# Patient Record
Sex: MALE | Race: WHITE | NOT HISPANIC OR LATINO | URBAN - METROPOLITAN AREA
[De-identification: names, ages, dates, MRNs, and addresses within clinical notes are randomized per-mention and may not be internally consistent; named-entity substitution may affect disease eponyms.]

---

## 2019-05-08 PROBLEM — Z00.00 ENCOUNTER FOR PREVENTIVE HEALTH EXAMINATION: Status: ACTIVE | Noted: 2019-05-08

## 2022-05-07 ENCOUNTER — EMERGENCY (EMERGENCY)
Facility: HOSPITAL | Age: 82
LOS: 1 days | Discharge: HOME | End: 2022-05-07
Attending: EMERGENCY MEDICINE | Admitting: EMERGENCY MEDICINE
Payer: COMMERCIAL

## 2022-05-07 VITALS
TEMPERATURE: 98 F | DIASTOLIC BLOOD PRESSURE: 85 MMHG | OXYGEN SATURATION: 96 % | HEART RATE: 100 BPM | RESPIRATION RATE: 18 BRPM | SYSTOLIC BLOOD PRESSURE: 163 MMHG

## 2022-05-07 PROCEDURE — 93010 ELECTROCARDIOGRAM REPORT: CPT | Mod: 59

## 2022-05-07 PROCEDURE — 99291 CRITICAL CARE FIRST HOUR: CPT

## 2022-05-07 RX ORDER — FAMOTIDINE 10 MG/ML
20 INJECTION INTRAVENOUS ONCE
Refills: 0 | Status: COMPLETED | OUTPATIENT
Start: 2022-05-07 | End: 2022-05-07

## 2022-05-07 RX ORDER — DEXAMETHASONE 0.5 MG/5ML
10 ELIXIR ORAL ONCE
Refills: 0 | Status: COMPLETED | OUTPATIENT
Start: 2022-05-07 | End: 2022-05-07

## 2022-05-07 RX ORDER — EPINEPHRINE 0.3 MG/.3ML
0.3 INJECTION INTRAMUSCULAR; SUBCUTANEOUS ONCE
Refills: 0 | Status: COMPLETED | OUTPATIENT
Start: 2022-05-07 | End: 2022-05-07

## 2022-05-07 RX ADMIN — EPINEPHRINE 0.3 MILLIGRAM(S): 0.3 INJECTION INTRAMUSCULAR; SUBCUTANEOUS at 23:25

## 2022-05-07 RX ADMIN — Medication 102 MILLIGRAM(S): at 23:25

## 2022-05-07 RX ADMIN — FAMOTIDINE 20 MILLIGRAM(S): 10 INJECTION INTRAVENOUS at 23:25

## 2022-05-07 NOTE — ED PROVIDER NOTE - PATIENT PORTAL LINK FT
You can access the FollowMyHealth Patient Portal offered by [facility] by registering at the following website: [email or web address]. By joining [employer]’s FollowMyHealth portal, you will also be able to view your health information using other applications (apps) compatible with our system.

## 2022-05-07 NOTE — ED PROVIDER NOTE - CLINICAL SUMMARY MEDICAL DECISION MAKING FREE TEXT BOX
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 Pt developed mild lower lip swelling last night, was worse this morning. Feels that his voice maybe be slightly raspy. Overall symptoms are improving but still present so came to ED. No other systemic symptoms. Has hx of similar prior lip swelling ~10yrs ago that resolved on its own after 2 days. On lisinopril.  Mild HTN, , other vitals wnl. Exam as above.  ddx: Angioedema, likely 2/2 ACE inhibitor. Clinically no significant airway involvement.   Basic labs, EKG, meds, observe in ED, reassess.   Pt refusing benadryl - states that it causes problems w/ his urination.

## 2022-05-07 NOTE — ED PROVIDER NOTE - NSDCPRINTRESULTS_ED_ALL_ED
Spoke with patient.   Advised per Maria Guadalupe Mcgowan, ELIANE swab positive for yeast which Maria Guadalupe already treated her for.  Otherwise normal.   Patient states understanding.    Patient requests all Lab, Cardiology, and Radiology Results on their Discharge Instructions

## 2022-05-07 NOTE — ED PROVIDER NOTE - NSFOLLOWUPINSTRUCTIONS_ED_ALL_ED_FT
Stay well hydrated.    Return to ER immediately (call 911) for worsening facial swelling, worsening breathing, voice changes, difficulty swallowing, fevers, persistent vomit, uncontrolled pain, worsening lightheaded.    Follow up with primary doctor within 1-2 days.     Discuss starting a different blood pressure medication in place of the lisinopril which you should STOP taking.      Angioedema    WHAT YOU NEED TO KNOW:    What is angioedema? Angioedema is sudden swelling caused by fluid that collects in deep layers of the skin. Swelling occurs most often on the face, lips, tongue, or throat, but it can happen anywhere in the body.     What increases my risk for angioedema? The exact cause of angioedema is often unknown. The following may increase your risk or trigger symptoms:   •Allergic reactions to foods, insect stings, or latex   •Medicines, such as ACE inhibitors, NSAIDs, and aspirin   •Cold, heat, pressure, trauma, or emotional stress   •A medical condition, such as autoimmune thyroid disease, lupus, or cancer   •A family history of angioedema     What are the signs and symptoms of angioedema? Skin swelling may be the only symptom. Swelling may be on one or both sides of the affected area. You may also have any of the following:   •Pain and burning in the swollen area   •Hives or an itchy rash  •A cough, wheezing, and shortness of breath  •Irritated eyes and nose  •Abdominal pain     How is the cause of angioedema diagnosed? Your healthcare provider will examine you and ask about your symptoms. He may also ask about your family medical history, medicines you take, and foods you eat. Tell your healthcare provider about any recent trauma, stress, or contact with allergens. You may need additional testing if you developed anaphylaxis after you were exposed to a trigger and then exercised. This is called exercise-induced anaphylaxis. You may need any of the following:   •Blood tests may be used to check for an autoimmune disease, infection, or inflammation. The tests may also be used to check your liver function.   •Skin prick tests are done to check for allergies.    How is angioedema treated? Angioedema usually goes away within 3 days without treatment, but it may come back. You may need any of the following:   •Antihistamines decrease symptoms such as itching or a rash.   •Epinephrine is medicine used to treat severe allergic reactions such as anaphylaxis.   •Steroids: This medicine may be given to decrease inflammation.

## 2022-05-07 NOTE — ED ADULT NURSE NOTE - OBJECTIVE STATEMENT
Pt presented to the ED with complaints of lip swelling that started yesterday night worsening this morning. Pt denies shortness of breath or throat swelling, able to speak in full sentences. Pt is on lisinopril, hydrochlorothiazide, and metoprolol.

## 2022-05-07 NOTE — ED ADULT TRIAGE NOTE - ARRIVAL INFO ADDITIONAL COMMENTS
pt c/o lower lip swelling since last night which has worsened throughout the day.   speech clear.   no drooling.  pt is on toprol and lisinopril

## 2022-05-07 NOTE — ED PROVIDER NOTE - PHYSICAL EXAMINATION
+Lower lip swelling.   No tonsillar hypertrophy, exudates, erythema. No obvious LAD. No trismus. No stridor/drooling, neck FROM. Normal sounding voice. Uvula midline. No other facial swelling.

## 2022-05-07 NOTE — ED PROVIDER NOTE - OBJECTIVE STATEMENT
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 Pt developed mild lower lip swelling last night, was worse this morning. Feels that his voice maybe be slightly raspy. Overall symptoms are improving but still present so came to ED. No other systemic symptoms. Has hx of similar prior lip swelling ~10yrs ago that resolved on its own after 2 days. On lisinopril.  Denies lightheaded, SOB, CP, sensation of throat closing, nausea, vomiting, diarrhea, abd pain, urinary complaints, rashes, pruritis. Cannot recall specific precipitant (no new food, medications, sprays, etc.).   meds: lisinopril, HCTZ, metoprolol

## 2022-05-07 NOTE — ED PROVIDER NOTE - PROGRESS NOTE DETAILS
Klepfish: pt remains unchanged. no sob. unchanged lower lip swelling. no resp distress. Will reassess. Klepfish: Pt remains well appearing - requesting straight cath - states he usually self caths twice a day and feels like he needs to urinate. No dysuria. Also requesting home dose of flomax. No SOB. Remains well appearing, still w/ lower lip swelling. Will reassess. Klepfish: UA w/ moderate leuk esterase, large blood, no other e/o inection. Clinically not UTI.   Still w/ mild lower lip swelling but improved from prior. Pt states he feels much better, no SOB, wants to go home. Wife at bedside. discussed risks/benefits of further observation/possible admission for persistent symptoms. Pt wants to go home. Discussed importance of outpt follow up and return precautions. Clinically no indication for further emergent ED workup or hospitalization at this time. Comfortable for dc, outpt f/u.

## 2022-05-07 NOTE — ED PROVIDER NOTE - CARE PLAN
1 Principal Discharge DX:	Angioedema of lips   Principal Discharge DX:	Angioedema of lips  Secondary Diagnosis:	Urinary retention

## 2022-05-08 VITALS
TEMPERATURE: 98 F | OXYGEN SATURATION: 98 % | RESPIRATION RATE: 16 BRPM | DIASTOLIC BLOOD PRESSURE: 73 MMHG | SYSTOLIC BLOOD PRESSURE: 149 MMHG | HEART RATE: 78 BPM

## 2022-05-08 LAB
ALBUMIN SERPL ELPH-MCNC: 4.4 G/DL — SIGNIFICANT CHANGE UP (ref 3.3–5)
ALP SERPL-CCNC: 80 U/L — SIGNIFICANT CHANGE UP (ref 40–120)
ALT FLD-CCNC: 17 U/L — SIGNIFICANT CHANGE UP (ref 10–45)
ANION GAP SERPL CALC-SCNC: 12 MMOL/L — SIGNIFICANT CHANGE UP (ref 5–17)
APPEARANCE UR: CLEAR — SIGNIFICANT CHANGE UP
AST SERPL-CCNC: 23 U/L — SIGNIFICANT CHANGE UP (ref 10–40)
BACTERIA # UR AUTO: SIGNIFICANT CHANGE UP /HPF
BASOPHILS # BLD AUTO: 0.09 K/UL — SIGNIFICANT CHANGE UP (ref 0–0.2)
BASOPHILS NFR BLD AUTO: 0.7 % — SIGNIFICANT CHANGE UP (ref 0–2)
BILIRUB SERPL-MCNC: 0.5 MG/DL — SIGNIFICANT CHANGE UP (ref 0.2–1.2)
BILIRUB UR-MCNC: NEGATIVE — SIGNIFICANT CHANGE UP
BUN SERPL-MCNC: 20 MG/DL — SIGNIFICANT CHANGE UP (ref 7–23)
CALCIUM SERPL-MCNC: 9.5 MG/DL — SIGNIFICANT CHANGE UP (ref 8.4–10.5)
CHLORIDE SERPL-SCNC: 98 MMOL/L — SIGNIFICANT CHANGE UP (ref 96–108)
CO2 SERPL-SCNC: 27 MMOL/L — SIGNIFICANT CHANGE UP (ref 22–31)
COLOR SPEC: YELLOW — SIGNIFICANT CHANGE UP
CREAT SERPL-MCNC: 0.95 MG/DL — SIGNIFICANT CHANGE UP (ref 0.5–1.3)
DIFF PNL FLD: ABNORMAL
EGFR: 79 ML/MIN/1.73M2 — SIGNIFICANT CHANGE UP
EOSINOPHIL # BLD AUTO: 0.21 K/UL — SIGNIFICANT CHANGE UP (ref 0–0.5)
EOSINOPHIL NFR BLD AUTO: 1.7 % — SIGNIFICANT CHANGE UP (ref 0–6)
EPI CELLS # UR: SIGNIFICANT CHANGE UP /HPF (ref 0–5)
GLUCOSE SERPL-MCNC: 141 MG/DL — HIGH (ref 70–99)
GLUCOSE UR QL: NEGATIVE — SIGNIFICANT CHANGE UP
HCT VFR BLD CALC: 39.5 % — SIGNIFICANT CHANGE UP (ref 39–50)
HGB BLD-MCNC: 13.1 G/DL — SIGNIFICANT CHANGE UP (ref 13–17)
IMM GRANULOCYTES NFR BLD AUTO: 0.7 % — SIGNIFICANT CHANGE UP (ref 0–1.5)
KETONES UR-MCNC: NEGATIVE — SIGNIFICANT CHANGE UP
LEUKOCYTE ESTERASE UR-ACNC: ABNORMAL
LYMPHOCYTES # BLD AUTO: 19.8 % — SIGNIFICANT CHANGE UP (ref 13–44)
LYMPHOCYTES # BLD AUTO: 2.38 K/UL — SIGNIFICANT CHANGE UP (ref 1–3.3)
MCHC RBC-ENTMCNC: 30.7 PG — SIGNIFICANT CHANGE UP (ref 27–34)
MCHC RBC-ENTMCNC: 33.2 GM/DL — SIGNIFICANT CHANGE UP (ref 32–36)
MCV RBC AUTO: 92.5 FL — SIGNIFICANT CHANGE UP (ref 80–100)
MONOCYTES # BLD AUTO: 1.1 K/UL — HIGH (ref 0–0.9)
MONOCYTES NFR BLD AUTO: 9.1 % — SIGNIFICANT CHANGE UP (ref 2–14)
NEUTROPHILS # BLD AUTO: 8.19 K/UL — HIGH (ref 1.8–7.4)
NEUTROPHILS NFR BLD AUTO: 68 % — SIGNIFICANT CHANGE UP (ref 43–77)
NITRITE UR-MCNC: NEGATIVE — SIGNIFICANT CHANGE UP
NRBC # BLD: 0 /100 WBCS — SIGNIFICANT CHANGE UP (ref 0–0)
PH UR: 6 — SIGNIFICANT CHANGE UP (ref 5–8)
PLATELET # BLD AUTO: 333 K/UL — SIGNIFICANT CHANGE UP (ref 150–400)
POTASSIUM SERPL-MCNC: 4.5 MMOL/L — SIGNIFICANT CHANGE UP (ref 3.5–5.3)
POTASSIUM SERPL-SCNC: 4.5 MMOL/L — SIGNIFICANT CHANGE UP (ref 3.5–5.3)
PROT SERPL-MCNC: 7.6 G/DL — SIGNIFICANT CHANGE UP (ref 6–8.3)
PROT UR-MCNC: NEGATIVE MG/DL — SIGNIFICANT CHANGE UP
RBC # BLD: 4.27 M/UL — SIGNIFICANT CHANGE UP (ref 4.2–5.8)
RBC # FLD: 13.5 % — SIGNIFICANT CHANGE UP (ref 10.3–14.5)
RBC CASTS # UR COMP ASSIST: < 5 /HPF — SIGNIFICANT CHANGE UP
SODIUM SERPL-SCNC: 137 MMOL/L — SIGNIFICANT CHANGE UP (ref 135–145)
SP GR SPEC: 1.02 — SIGNIFICANT CHANGE UP (ref 1–1.03)
UROBILINOGEN FLD QL: 0.2 E.U./DL — SIGNIFICANT CHANGE UP
WBC # BLD: 12.05 K/UL — HIGH (ref 3.8–10.5)
WBC # FLD AUTO: 12.05 K/UL — HIGH (ref 3.8–10.5)
WBC UR QL: < 5 /HPF — SIGNIFICANT CHANGE UP

## 2022-05-08 PROCEDURE — 80053 COMPREHEN METABOLIC PANEL: CPT

## 2022-05-08 PROCEDURE — 81001 URINALYSIS AUTO W/SCOPE: CPT

## 2022-05-08 PROCEDURE — 99291 CRITICAL CARE FIRST HOUR: CPT | Mod: 25

## 2022-05-08 PROCEDURE — 85025 COMPLETE CBC W/AUTO DIFF WBC: CPT

## 2022-05-08 PROCEDURE — 96365 THER/PROPH/DIAG IV INF INIT: CPT

## 2022-05-08 PROCEDURE — 87086 URINE CULTURE/COLONY COUNT: CPT

## 2022-05-08 PROCEDURE — 36415 COLL VENOUS BLD VENIPUNCTURE: CPT

## 2022-05-08 PROCEDURE — 93005 ELECTROCARDIOGRAM TRACING: CPT

## 2022-05-08 RX ORDER — TAMSULOSIN HYDROCHLORIDE 0.4 MG/1
0.4 CAPSULE ORAL ONCE
Refills: 0 | Status: COMPLETED | OUTPATIENT
Start: 2022-05-08 | End: 2022-05-08

## 2022-05-08 RX ADMIN — TAMSULOSIN HYDROCHLORIDE 0.4 MILLIGRAM(S): 0.4 CAPSULE ORAL at 02:01

## 2022-05-08 RX ADMIN — Medication 10 MILLIGRAM(S): at 00:00

## 2022-05-08 NOTE — ED ADULT NURSE REASSESSMENT NOTE - NS ED NURSE REASSESS COMMENT FT1
Improvement noted to lower lip swelling, pt continue to denies shortness of breath, states that "he is feeling fine and ready to go".

## 2022-05-08 NOTE — ED ADULT NURSE REASSESSMENT NOTE - NS ED NURSE REASSESS COMMENT FT1
Straight cath performed, pt tolerated well. 550mL output, UA and UC sent. Safety precautions in place.

## 2022-05-09 LAB
CULTURE RESULTS: SIGNIFICANT CHANGE UP
SPECIMEN SOURCE: SIGNIFICANT CHANGE UP

## 2022-05-10 DIAGNOSIS — K13.0 DISEASES OF LIPS: ICD-10-CM

## 2022-05-10 DIAGNOSIS — I10 ESSENTIAL (PRIMARY) HYPERTENSION: ICD-10-CM

## 2022-05-10 DIAGNOSIS — I45.10 UNSPECIFIED RIGHT BUNDLE-BRANCH BLOCK: ICD-10-CM

## 2022-05-10 DIAGNOSIS — R33.8 OTHER RETENTION OF URINE: ICD-10-CM
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
## 2022-05-10 DIAGNOSIS — T78.3XXA ANGIONEUROTIC EDEMA, INITIAL ENCOUNTER: ICD-10-CM
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
## 2022-05-10 DIAGNOSIS — N40.1 BENIGN PROSTATIC HYPERPLASIA WITH LOWER URINARY TRACT SYMPTOMS: ICD-10-CM

## 2022-06-17 ENCOUNTER — EMERGENCY (EMERGENCY)
Facility: HOSPITAL | Age: 82
LOS: 1 days | Discharge: HOME | End: 2022-06-17
Attending: EMERGENCY MEDICINE | Admitting: EMERGENCY MEDICINE
Payer: MEDICARE

## 2022-06-17 VITALS
DIASTOLIC BLOOD PRESSURE: 84 MMHG | HEART RATE: 100 BPM | SYSTOLIC BLOOD PRESSURE: 184 MMHG | OXYGEN SATURATION: 98 % | RESPIRATION RATE: 16 BRPM | TEMPERATURE: 98 F

## 2022-06-17 PROCEDURE — 99284 EMERGENCY DEPT VISIT MOD MDM: CPT | Mod: GC

## 2022-06-18 ENCOUNTER — EMERGENCY (EMERGENCY)
Facility: HOSPITAL | Age: 82
LOS: 1 days | Discharge: HOME | End: 2022-06-18
Attending: EMERGENCY MEDICINE | Admitting: EMERGENCY MEDICINE
Payer: COMMERCIAL

## 2022-06-18 VITALS
DIASTOLIC BLOOD PRESSURE: 89 MMHG | HEART RATE: 100 BPM | OXYGEN SATURATION: 100 % | TEMPERATURE: 99 F | SYSTOLIC BLOOD PRESSURE: 170 MMHG | RESPIRATION RATE: 18 BRPM

## 2022-06-18 VITALS
HEART RATE: 90 BPM | OXYGEN SATURATION: 100 % | RESPIRATION RATE: 17 BRPM | DIASTOLIC BLOOD PRESSURE: 79 MMHG | TEMPERATURE: 98 F | SYSTOLIC BLOOD PRESSURE: 166 MMHG

## 2022-06-18 PROCEDURE — 73110 X-RAY EXAM OF WRIST: CPT | Mod: 26,LT

## 2022-06-18 PROCEDURE — 73080 X-RAY EXAM OF ELBOW: CPT | Mod: 26,LT

## 2022-06-18 PROCEDURE — 73090 X-RAY EXAM OF FOREARM: CPT | Mod: 26,LT

## 2022-06-18 PROCEDURE — 73140 X-RAY EXAM OF FINGER(S): CPT | Mod: 26,RT

## 2022-06-18 PROCEDURE — 99284 EMERGENCY DEPT VISIT MOD MDM: CPT | Mod: 25,GC

## 2022-06-18 PROCEDURE — 29125 APPL SHORT ARM SPLINT STATIC: CPT | Mod: GC

## 2022-06-18 RX ORDER — LIDOCAINE HCL 20 MG/ML
5 VIAL (ML) INJECTION ONCE
Refills: 0 | Status: COMPLETED | OUTPATIENT
Start: 2022-06-18 | End: 2022-06-18

## 2022-06-18 RX ORDER — OXYCODONE HYDROCHLORIDE 5 MG/1
5 TABLET ORAL ONCE
Refills: 0 | Status: DISCONTINUED | OUTPATIENT
Start: 2022-06-18 | End: 2022-06-18

## 2022-06-18 RX ORDER — OXYCODONE HYDROCHLORIDE 5 MG/1
1 TABLET ORAL
Qty: 9 | Refills: 0
Start: 2022-06-18 | End: 2022-06-20

## 2022-06-18 RX ADMIN — OXYCODONE HYDROCHLORIDE 5 MILLIGRAM(S): 5 TABLET ORAL at 01:57

## 2022-06-18 RX ADMIN — Medication 5 MILLILITER(S): at 04:04

## 2022-06-18 RX ADMIN — OXYCODONE HYDROCHLORIDE 5 MILLIGRAM(S): 5 TABLET ORAL at 04:00

## 2022-06-18 NOTE — ED ADULT NURSE NOTE - OBJECTIVE STATEMENT
pt received to rm 15 , a&ox4 , ambulatory , pmh of HTN, p/w post fall on uneven ground coming home at night. pt denies LOC or head trauma  . Pt breathing even and unlabored on room air. Denies fever, chills, cough, SOB, chest pain, palpitations, dizziness, N/V/D, constipation, numbness, tingling. . pending xray  . pt educated on fall precautions and confirms understanding via teach back method. Stretcher locked in lowest position with siderails up x2. Call bell and personal items within reach.

## 2022-06-18 NOTE — ED PROVIDER NOTE - NS ED ROS FT
REVIEW OF SYSTEMS:    CONSTITUTIONAL: No weakness, fevers or chills  EYES/ENT: No visual changes;  No vertigo or throat pain   NECK: No pain or stiffness  RESPIRATORY: No cough, wheezing, hemoptysis; No shortness of breath  CARDIOVASCULAR: No chest pain or palpitations  GASTROINTESTINAL: No abdominal or epigastric pain. No nausea, vomiting, or hematemesis; No diarrhea or constipation. No melena or hematochezia.  GENITOURINARY: No dysuria, frequency or hematuria  NEUROLOGICAL: C/o pain in left arm  SKIN: No itching, rashes

## 2022-06-18 NOTE — ED PROVIDER NOTE - CLINICAL SUMMARY MEDICAL DECISION MAKING FREE TEXT BOX
83yo M with history of HTN presenting s/p mechanical fall, found to have distal radial fracture displaced. Ortho c/s, xrays.

## 2022-06-18 NOTE — PROVIDER CONTACT NOTE (OTHER) - ASSESSMENT
MD requesting taxi assistance for pt. Pt traveling with spouse. Writer arranged taxi p/up with [employer] #[phone]. Pt p/up in 20 minutes to be escorted to taxi.

## 2022-06-18 NOTE — ED PROVIDER NOTE - OBJECTIVE STATEMENT
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 Patient reports everything started after TURP 3 weeks prior. Reports LE weakness following TURP, no pain. 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 Patient reports everything started after TURP 3 weeks prior. Reports LE weakness following TURP, no pain in lower extremities. Did not hit head. Not c/o any other body pain.

## 2022-06-18 NOTE — ED ADULT TRIAGE NOTE - CHIEF COMPLAINT QUOTE
was seen yesterday for fall dx with left wrist fx. today had an episode of AMS where pt took off cast that was placed. pt A&xo4 but forgetful at times. . speech clear , strength equal. no facial droop

## 2022-06-18 NOTE — CONSULT NOTE ADULT - SUBJECTIVE AND OBJECTIVE BOX
Orthopedic Surgery Consult Note    82yMale RHD presents with severe left wrist pain s/p mechanical fall. 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 Patient denies headstrike and LOC. Denies radiation of pain. Denies numbness, tingling or burning in affected hand/fingers. Patient denies any other injuries.    PAST MEDICAL & SURGICAL HISTORY:  Hypertension        Allergies    lisinopril (Angioedema)    Intolerances          PE  Gen: NAD  LUE:  Superficial abrasions over volar wrist, visible deformity of wrist, + soft tissue swelling   Decreased ROM of wrist 2/2 pain, +ttp about wrist, no ttp about elbow  Forearm comparments soft  Motor intact AIN/PIN/U  SILT M/U/R, 2+ rad    Imaging:  XR showing dorsally displaced left distal radius fracture    Procedure Note:  After skin preparation with alcohol swab 10cc of 1% Lidocaine injected into area around DR/Ulna as hematoma block. Closed reduction maneuver performed and a well padded sugartong splint was applied. Patient tolerated the procedure well. Post-XR obtained which show improved alignment. Pt NVI post procedure.

## 2022-06-18 NOTE — ED PROVIDER NOTE - NSFOLLOWUPINSTRUCTIONS_ED_ALL_ED_FT
You had a DISTAL RADIUS FRACTURE in your left arm.     It was REDUCED by the orthopedic resident.     Follow up with Dr. Odom within one week. Call [phone] for appointment    Please follow up with your primary care doctor after you leave the emergency department so that they can follow up and conduct more testing and treatment as they deem necessary. If you have worsening signs or symptoms of what you came in to the Emergency Department today and are not able to see your doctor, go to your nearest emergency department or return to the [facility] emergency department for further care and management.

## 2022-06-18 NOTE — ED PROVIDER NOTE - PATIENT PORTAL LINK FT
You can access the FollowMyHealth Patient Portal offered by [facility] by registering at the following website: [email or web address]. By joining [employer]’s FollowMyHealth portal, you will also be able to view your health information using other applications (apps) compatible with our system.

## 2022-06-18 NOTE — ED PROVIDER NOTE - ATTENDING CONTRIBUTION TO CARE
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 Patient reports everything started after TURP 3 weeks prior. Reports LE weakness following TURP, no pain in lower extremities. Did not hit head. Not c/o any other body pain.  EXAM: LUE with gross Spoon fork deformity at distal radius, neurovascularly intact, normal radial pulse. Cap refill < 2 sec.   MDM: pt with HTN that had mech slip and fall FOOSH left arm (non dominant hand) that has gross MSK deformity on exam but neurovasc intact. No open wounds/fx. Will obtain XR< likely consult Ortho for reduction and provide pain meds.

## 2022-06-18 NOTE — CONSULT NOTE ADULT - ASSESSMENT
A/P: 82yMale s/p closed reduction and splinting of left distal radius fracture    - Pain control  - Strict Ice/Elevation  - NWB on affected extremity with splint and sling  - Keep splint clean/dry/intact until follow up  - Encourage active finger motion  - Follow up with Dr. Odom within one week. Call [phone] for appointment

## 2022-06-18 NOTE — ED PROVIDER NOTE - PROGRESS NOTE DETAILS
Patient with worsening neurologic status of L. hand, reporting numbness in L middle finger, and half of 2nd and 4th digit. Ortho paged again. Silvina, pgy2: Ortho evaluated patient, bedside closed reduction performed. Silvina, pgy2: Patient with worsening neurologic status of L. hand, reporting numbness in L middle finger, and half of 2nd and 4th digit. Ortho paged again. MONIKA: Pt was advised to f/u with Dr Odom within 1 wk number given to make appt.

## 2022-06-19 VITALS
SYSTOLIC BLOOD PRESSURE: 164 MMHG | RESPIRATION RATE: 17 BRPM | OXYGEN SATURATION: 100 % | HEART RATE: 99 BPM | TEMPERATURE: 99 F | DIASTOLIC BLOOD PRESSURE: 79 MMHG

## 2022-06-19 LAB
ALBUMIN SERPL ELPH-MCNC: 4.6 G/DL — SIGNIFICANT CHANGE UP (ref 3.3–5)
ALP SERPL-CCNC: 77 U/L — SIGNIFICANT CHANGE UP (ref 40–120)
ALT FLD-CCNC: 20 U/L — SIGNIFICANT CHANGE UP (ref 4–41)
ANION GAP SERPL CALC-SCNC: 14 MMOL/L — SIGNIFICANT CHANGE UP (ref 7–14)
APPEARANCE UR: CLEAR — SIGNIFICANT CHANGE UP
AST SERPL-CCNC: 22 U/L — SIGNIFICANT CHANGE UP (ref 4–40)
BACTERIA # UR AUTO: ABNORMAL
BILIRUB SERPL-MCNC: 1.1 MG/DL — SIGNIFICANT CHANGE UP (ref 0.2–1.2)
BILIRUB UR-MCNC: NEGATIVE — SIGNIFICANT CHANGE UP
BUN SERPL-MCNC: 25 MG/DL — HIGH (ref 7–23)
CALCIUM SERPL-MCNC: 9.3 MG/DL — SIGNIFICANT CHANGE UP (ref 8.4–10.5)
CHLORIDE SERPL-SCNC: 98 MMOL/L — SIGNIFICANT CHANGE UP (ref 98–107)
CO2 SERPL-SCNC: 23 MMOL/L — SIGNIFICANT CHANGE UP (ref 22–31)
COLOR SPEC: SIGNIFICANT CHANGE UP
CREAT SERPL-MCNC: 1.05 MG/DL — SIGNIFICANT CHANGE UP (ref 0.5–1.3)
DIFF PNL FLD: ABNORMAL
EGFR: 70 ML/MIN/1.73M2 — SIGNIFICANT CHANGE UP
EPI CELLS # UR: 1 /HPF — SIGNIFICANT CHANGE UP (ref 0–5)
GLUCOSE SERPL-MCNC: 147 MG/DL — HIGH (ref 70–99)
GLUCOSE UR QL: NEGATIVE — SIGNIFICANT CHANGE UP
HCT VFR BLD CALC: 45.2 % — SIGNIFICANT CHANGE UP (ref 39–50)
HGB BLD-MCNC: 14.3 G/DL — SIGNIFICANT CHANGE UP (ref 13–17)
HYALINE CASTS # UR AUTO: 1 /LPF — SIGNIFICANT CHANGE UP (ref 0–7)
KETONES UR-MCNC: NEGATIVE — SIGNIFICANT CHANGE UP
LEUKOCYTE ESTERASE UR-ACNC: ABNORMAL
MCHC RBC-ENTMCNC: 29.4 PG — SIGNIFICANT CHANGE UP (ref 27–34)
MCHC RBC-ENTMCNC: 31.6 GM/DL — LOW (ref 32–36)
MCV RBC AUTO: 92.8 FL — SIGNIFICANT CHANGE UP (ref 80–100)
NITRITE UR-MCNC: NEGATIVE — SIGNIFICANT CHANGE UP
NRBC # BLD: 0 /100 WBCS — SIGNIFICANT CHANGE UP
NRBC # FLD: 0 K/UL — SIGNIFICANT CHANGE UP
PH UR: 6 — SIGNIFICANT CHANGE UP (ref 5–8)
PLATELET # BLD AUTO: 312 K/UL — SIGNIFICANT CHANGE UP (ref 150–400)
POTASSIUM SERPL-MCNC: 4 MMOL/L — SIGNIFICANT CHANGE UP (ref 3.5–5.3)
POTASSIUM SERPL-SCNC: 4 MMOL/L — SIGNIFICANT CHANGE UP (ref 3.5–5.3)
PROT SERPL-MCNC: 7.6 G/DL — SIGNIFICANT CHANGE UP (ref 6–8.3)
PROT UR-MCNC: ABNORMAL
RBC # BLD: 4.87 M/UL — SIGNIFICANT CHANGE UP (ref 4.2–5.8)
RBC # FLD: 14.2 % — SIGNIFICANT CHANGE UP (ref 10.3–14.5)
RBC CASTS # UR COMP ASSIST: 2 /HPF — SIGNIFICANT CHANGE UP (ref 0–4)
SODIUM SERPL-SCNC: 135 MMOL/L — SIGNIFICANT CHANGE UP (ref 135–145)
SP GR SPEC: 1.02 — SIGNIFICANT CHANGE UP (ref 1–1.05)
UROBILINOGEN FLD QL: SIGNIFICANT CHANGE UP
WBC # BLD: 13.1 K/UL — HIGH (ref 3.8–10.5)
WBC # FLD AUTO: 13.1 K/UL — HIGH (ref 3.8–10.5)
WBC UR QL: 37 /HPF — HIGH (ref 0–5)

## 2022-06-19 PROCEDURE — 73110 X-RAY EXAM OF WRIST: CPT | Mod: 26,LT

## 2022-06-19 PROCEDURE — 70450 CT HEAD/BRAIN W/O DYE: CPT | Mod: 26,MA

## 2022-06-19 RX ORDER — ACETAMINOPHEN 500 MG
650 TABLET ORAL ONCE
Refills: 0 | Status: COMPLETED | OUTPATIENT
Start: 2022-06-19 | End: 2022-06-19

## 2022-06-19 RX ORDER — CEFTRIAXONE 500 MG/1
1000 INJECTION, POWDER, FOR SOLUTION INTRAMUSCULAR; INTRAVENOUS ONCE
Refills: 0 | Status: COMPLETED | OUTPATIENT
Start: 2022-06-19 | End: 2022-06-19

## 2022-06-19 RX ORDER — CEFUROXIME AXETIL 250 MG
1 TABLET ORAL
Qty: 20 | Refills: 0
Start: 2022-06-19 | End: 2022-06-28

## 2022-06-19 RX ADMIN — CEFTRIAXONE 100 MILLIGRAM(S): 500 INJECTION, POWDER, FOR SOLUTION INTRAMUSCULAR; INTRAVENOUS at 02:10

## 2022-06-19 RX ADMIN — Medication 650 MILLIGRAM(S): at 02:09

## 2022-06-19 NOTE — ED PROVIDER NOTE - ATTENDING CONTRIBUTION TO CARE
DR. BLOCH, ATTENDING MD-  I performed a face to face bedside interview with patient regarding history of present illness, review of symptoms and past medical history. I completed an independent physical exam.  I have discussed patient's plan of care with the resident.  Patient with confusion and agitation after taking oxycodone for wrist fx, tore off splint,  Now alert and oriented, HEENT nml heart s1s2, lungs clear, abd  protruberant, nontender, extrem no edema, neuro motor and sensory intact.

## 2022-06-19 NOTE — ED PROVIDER NOTE - PATIENT PORTAL LINK FT
You can access the FollowMyHealth Patient Portal offered by [facility] by registering at the following website: [email or web address]. By joining [employer]’s FollowMyHealth portal, you will also be able to view your health information using other applications (apps) compatible with our system.

## 2022-06-19 NOTE — ED PROVIDER NOTE - NSFOLLOWUPINSTRUCTIONS_ED_ALL_ED_FT
Urinary Tract Infection    A urinary tract infection (UTI) is an infection of any part of the urinary tract, which includes the kidneys, ureters, bladder, and urethra. Risk factors include ignoring your need to urinate, wiping back to front if female, being an uncircumcised male, and having diabetes or a weak immune system. Symptoms include frequent urination, pain or burning with urination, foul smelling urine, cloudy urine, pain in the lower abdomen, blood in the urine, and fever. If you were prescribed an antibiotic medicine, take it as told by your health care provider. Do not stop taking the antibiotic even if you start to feel better.    SEEK IMMEDIATE MEDICAL CARE IF YOU HAVE ANY OF THE FOLLOWING SYMPTOMS: severe back or abdominal pain, fever, inability to keep fluids or medicine down, dizziness/lightheadedness, or a change in mental status.     -Please follow up with your Primary Care Doctor within 24-48 hours and bring your paperwork     Fracture    A fracture is a break in one of your bones. This can occur from a variety of injuries, especially traumatic ones. Symptoms include pain, bruising, or swelling. Do not use the injured limb. If a fracture is in one of the bones below your waist, do not put weight on that limb unless instructed to do so by your healthcare provider. Crutches or a cane may have been provided. A splint or cast may have been applied by your health care provider. Make sure to keep it dry and follow up with an orthopedist as instructed.    SEEK IMMEDIATE MEDICAL CARE IF YOU HAVE ANY OF THE FOLLOWING SYMPTOMS: numbness, tingling, increasing pain, or weakness in any part of the injured limb.     - Keep splint on, encourage finger movement, follow up with orthopedics in 6 days

## 2022-06-19 NOTE — ED PROVIDER NOTE - PROGRESS NOTE DETAILS
Phillip, PGY3: sugar tong splint applied and given sling. VSS. Time was taken to answer all of patients questions and concerns. Return precaution instructions were given and patient understands and feels comfortable with disposition home. will f/u urine culture results and see orthopedics in 6d

## 2022-06-19 NOTE — ED ADULT NURSE NOTE - PAIN RATING/NUMBER SCALE (0-10): ACTIVITY
7 Drysol Pregnancy And Lactation Text: This medication is considered safe during pregnancy and breast feeding.

## 2022-06-19 NOTE — ED PROVIDER NOTE - OBJECTIVE STATEMENT
81y/o M w/ h/o HTN, recent Colles fracture seen yesterday in [facility] ED s/p mechanical fall and had Colles fracture returns to the ED w/ wife for AMS. As per wife pt was having pain, took oxycodone and was altered ripped off splint. Now is back to baseline mental status. No numbness, tingling, pain out of proportion, rash, bleeding. 81y/o M w/ h/o HTN, recent Colles fracture seen yesterday in [facility] ED s/p mechanical fall returns to the ED w/ wife for AMS. As per wife pt was having pain, took oxycodone and was altered ripped off splint. Now is back to baseline mental status. Has been urinating more frequently. No numbness, tingling, pain out of proportion, rash, bleeding.

## 2022-06-19 NOTE — ED PROVIDER NOTE - CLINICAL SUMMARY MEDICAL DECISION MAKING FREE TEXT BOX
83y/o M w/ h/o HTN, recent Colles fracture seen yesterday in [facility] ED s/p mechanical fall returns for AMS, ripped off splint. Now is back to baseline mental status without signs of compartment syndrome or open fracture. Family attributes symptoms to high dose of oxycodone and opioid naive. PLan UA, XR r/o worsening fx, labs, CTH and likely d/c home with tylenol for pain regimen.

## 2022-06-19 NOTE — ED ADULT NURSE NOTE - OBJECTIVE STATEMENT
Pt received to room 1. Pt A&Ox3, amb at baseline with assistance. Pmh of HTN. Pt coming in after being seen yesterday at [facility] for fall with left wrist fracture. While pt was here, ortho team came to see the pt and performed a reduction in which pt was discharged and prescribed oxycodone for pain control. As per pt and wife at bedside, pt states the medication strength may have been too strong and caused AMS to the pt. Pt slightly remembers what happened, but states he knows that his behavior was erratic and tried ripping off the cast. Pt vitally stable, blood pressure elevated. Resp even unlabored, abd soft nontender, pedal pulses 2+ bilaterally. 20G IV placed to R AC, labs sent, awaiting CT results.

## 2022-06-19 NOTE — ED PROVIDER NOTE - NS ED ROS FT
GENERAL: No fever, no chills  EYES: no change in vision  HEENT: no trouble swallowing, no trouble speaking  CARDIAC: no chest pain, no palpitations  PULMONARY: no cough, no SOB  GI: no abdominal pain, no nausea, no vomiting, no diarrhea, no constipation  : no dysuria, no frequency, no change in appearance, no odor of urine  SKIN: no rashes  NEURO: no headache, no weakness  MSK: +L wrist pain

## 2022-06-20 PROBLEM — Z00.00 ENCOUNTER FOR PREVENTIVE HEALTH EXAMINATION: Noted: 2022-06-20

## 2022-06-20 LAB
CULTURE RESULTS: NO GROWTH — SIGNIFICANT CHANGE UP
SPECIMEN SOURCE: SIGNIFICANT CHANGE UP

## 2022-06-21 NOTE — ED POST DISCHARGE NOTE - RESULT SUMMARY
LEISA Pop: Dr. Crouch called for pts ucx results, spoke w/ pt informed of negative ucx, said he can stop abx.

## 2022-06-22 ENCOUNTER — APPOINTMENT (OUTPATIENT)
Dept: ORTHOPEDIC SURGERY | Facility: CLINIC | Age: 82
End: 2022-06-22

## 2022-06-24 LAB
CULTURE RESULTS: SIGNIFICANT CHANGE UP
CULTURE RESULTS: SIGNIFICANT CHANGE UP
SPECIMEN SOURCE: SIGNIFICANT CHANGE UP
SPECIMEN SOURCE: SIGNIFICANT CHANGE UP

## 2022-06-25 ENCOUNTER — FORM ENCOUNTER (OUTPATIENT)
Age: 82
End: 2022-06-25

## 2022-07-19 PROBLEM — I10 ESSENTIAL (PRIMARY) HYPERTENSION: Chronic | Status: ACTIVE | Noted: 2022-06-18

## 2022-07-21 ENCOUNTER — APPOINTMENT (OUTPATIENT)
Dept: PULMONOLOGY | Facility: CLINIC | Age: 82
End: 2022-07-21

## 2022-07-21 VITALS
TEMPERATURE: 98.2 F | OXYGEN SATURATION: 92 % | HEART RATE: 79 BPM | SYSTOLIC BLOOD PRESSURE: 176 MMHG | DIASTOLIC BLOOD PRESSURE: 74 MMHG

## 2022-07-21 LAB
BILIRUB UR QL STRIP: NORMAL
CLARITY UR: CLEAR
COLLECTION METHOD: NORMAL
GLUCOSE UR-MCNC: NORMAL
HCG UR QL: 0.2 EU/DL
HGB UR QL STRIP.AUTO: NORMAL
KETONES UR-MCNC: NORMAL
LEUKOCYTE ESTERASE UR QL STRIP: NORMAL
NITRITE UR QL STRIP: NORMAL
PH UR STRIP: 5.5
PROT UR STRIP-MCNC: NORMAL
SP GR UR STRIP: 1.01

## 2022-07-21 PROCEDURE — 99204 OFFICE O/P NEW MOD 45 MIN: CPT | Mod: 25

## 2022-07-21 PROCEDURE — 81003 URINALYSIS AUTO W/O SCOPE: CPT | Mod: QW

## 2022-07-21 PROCEDURE — 71046 X-RAY EXAM CHEST 2 VIEWS: CPT

## 2022-07-21 NOTE — ASSESSMENT
[FreeTextEntry1] : Recent change in mental status likely secondary to combination of head injury urinary tract infection and use of narcotics.  However I am concerned about his overall neurological status and he definitely needs a neurology evaluation.  He should be given some time to clear the acute phase before this takes place but ultimately likely will be required.\par \par Check labs and urine culture.\par \par No obvious cause of wheezing noted in the mornings possibly this is an acid reflux issue.

## 2022-07-21 NOTE — HISTORY OF PRESENT ILLNESS
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  Has had overall deterioration over the past several weeks.  He had a fall and sustained a Colles' fracture.  He was treated with narcotics and he had a series of urinary tract infections.  His acute visit was precipitated by a complaint of noisy breathing noted in the mornings.  He has a history of hypertension.  He has a history of of several recent urological procedures including a UroLift and a TURP.  Up until these events he was working full-time he is now working about 1 day a week.  He has no reported underlying cardiopulmonary disease.\par \par \par \par \par \par

## 2022-07-21 NOTE — PHYSICAL EXAM
[No Acute Distress] : no acute distress [Normal Oropharynx] : normal oropharynx [Normal Appearance] : normal appearance [No Neck Mass] : no neck mass [Normal Rate/Rhythm] : normal rate/rhythm [Normal S1, S2] : normal s1, s2 [No Murmurs] : no murmurs [No Resp Distress] : no resp distress [Clear to Auscultation Bilaterally] : clear to auscultation bilaterally [No Abnormalities] : no abnormalities [Benign] : benign [Normal Gait] : normal gait [No Clubbing] : no clubbing [No Cyanosis] : no cyanosis [No Edema] : no edema [FROM] : FROM [Normal Color/ Pigmentation] : normal color/ pigmentation [Oriented x3] : oriented x3 [Normal Affect] : normal affect [TextBox_132] : Wide gait

## 2022-07-21 NOTE — REVIEW OF SYSTEMS
[Fatigue] : fatigue [Chills] : chills [Wheezing] : wheezing [Urgency] : frequency [Negative] : Endocrine [TextBox_119] : See HPI [TextBox_83] : See HPI

## 2022-07-21 NOTE — PROCEDURE
[FreeTextEntry1] : Brain MRI shows mild microvascular changes bilaterally in the supratentorial compartment.  To my review the ventricles appear somewhat enlarged although this is likely age-appropriate.

## 2022-07-22 LAB
ALBUMIN SERPL ELPH-MCNC: 4.8 G/DL
ALP BLD-CCNC: 105 U/L
ALT SERPL-CCNC: 21 U/L
ANION GAP SERPL CALC-SCNC: 12 MMOL/L
AST SERPL-CCNC: 16 U/L
BASOPHILS # BLD AUTO: 0.08 K/UL
BASOPHILS NFR BLD AUTO: 0.9 %
BILIRUB SERPL-MCNC: 0.4 MG/DL
BUN SERPL-MCNC: 19 MG/DL
CALCIUM SERPL-MCNC: 9.9 MG/DL
CHLORIDE SERPL-SCNC: 100 MMOL/L
CO2 SERPL-SCNC: 29 MMOL/L
CREAT SERPL-MCNC: 0.93 MG/DL
EGFR: 81 ML/MIN/1.73M2
EOSINOPHIL # BLD AUTO: 0.35 K/UL
EOSINOPHIL NFR BLD AUTO: 4.1 %
GLUCOSE SERPL-MCNC: 124 MG/DL
HCT VFR BLD CALC: 46.3 %
HGB BLD-MCNC: 14.6 G/DL
IMM GRANULOCYTES NFR BLD AUTO: 0.7 %
LYMPHOCYTES # BLD AUTO: 2.07 K/UL
LYMPHOCYTES NFR BLD AUTO: 24 %
MAN DIFF?: NORMAL
MCHC RBC-ENTMCNC: 29.1 PG
MCHC RBC-ENTMCNC: 31.5 GM/DL
MCV RBC AUTO: 92.4 FL
MONOCYTES # BLD AUTO: 0.83 K/UL
MONOCYTES NFR BLD AUTO: 9.6 %
NEUTROPHILS # BLD AUTO: 5.22 K/UL
NEUTROPHILS NFR BLD AUTO: 60.7 %
NT-PROBNP SERPL-MCNC: 275 PG/ML
PLATELET # BLD AUTO: 324 K/UL
POTASSIUM SERPL-SCNC: 4.5 MMOL/L
PROT SERPL-MCNC: 7.3 G/DL
RBC # BLD: 5.01 M/UL
RBC # FLD: 14.6 %
SODIUM SERPL-SCNC: 141 MMOL/L
TSH SERPL-ACNC: 2.69 UIU/ML
WBC # FLD AUTO: 8.61 K/UL

## 2022-07-23 ENCOUNTER — NON-APPOINTMENT (OUTPATIENT)
Age: 82
End: 2022-07-23

## 2022-07-25 LAB — BACTERIA UR CULT: NORMAL

## 2022-07-29 ENCOUNTER — APPOINTMENT (OUTPATIENT)
Dept: PULMONOLOGY | Facility: CLINIC | Age: 82
End: 2022-07-29

## 2022-07-29 ENCOUNTER — LABORATORY RESULT (OUTPATIENT)
Age: 82
End: 2022-07-29

## 2022-07-29 VITALS — HEART RATE: 72 BPM | DIASTOLIC BLOOD PRESSURE: 72 MMHG | SYSTOLIC BLOOD PRESSURE: 162 MMHG | OXYGEN SATURATION: 95 %

## 2022-07-29 DIAGNOSIS — N39.0 URINARY TRACT INFECTION, SITE NOT SPECIFIED: ICD-10-CM

## 2022-07-29 DIAGNOSIS — I10 ESSENTIAL (PRIMARY) HYPERTENSION: ICD-10-CM

## 2022-07-29 DIAGNOSIS — R29.6 REPEATED FALLS: ICD-10-CM

## 2022-07-29 PROCEDURE — 99214 OFFICE O/P EST MOD 30 MIN: CPT | Mod: 25

## 2022-07-29 PROCEDURE — 95012 NITRIC OXIDE EXP GAS DETER: CPT

## 2022-07-29 PROCEDURE — 94060 EVALUATION OF WHEEZING: CPT

## 2022-07-29 PROCEDURE — ZZZZZ: CPT

## 2022-07-30 NOTE — ASSESSMENT
[FreeTextEntry1] : Appears to have component of asthma.  Note elevated NIOX.  Start Asmanex inhaler.  Assess allergies.\par \par Recommend urology evaluation referred to [facility]\par \par Needs full neurology assessment.  Daughter is neurologist.  Refer to Stepan Kelley, requesting physical therapy evaluation as well.

## 2022-07-30 NOTE — HISTORY OF PRESENT ILLNESS
[TextBox_4] : Continues to have wheezing issues.  Wheezing issues primarily first waking up in the morning.  Walking badly says "his shoes are not good".  Recovering from fall still with recall issues regarding memory.  No acute urological complaint however still having incontinence issues.

## 2022-08-01 LAB
BASOPHILS # BLD AUTO: 0.08 K/UL
BASOPHILS NFR BLD AUTO: 1 %
EOSINOPHIL # BLD AUTO: 0.34 K/UL
EOSINOPHIL NFR BLD AUTO: 4.4 %
FOLATE SERPL-MCNC: 15.6 NG/ML
HCT VFR BLD CALC: 45 %
HGB BLD-MCNC: 14.2 G/DL
IMM GRANULOCYTES NFR BLD AUTO: 0.5 %
LYMPHOCYTES # BLD AUTO: 2.28 K/UL
LYMPHOCYTES NFR BLD AUTO: 29.7 %
MAN DIFF?: NORMAL
MCHC RBC-ENTMCNC: 29.2 PG
MCHC RBC-ENTMCNC: 31.6 GM/DL
MCV RBC AUTO: 92.6 FL
MONOCYTES # BLD AUTO: 0.79 K/UL
MONOCYTES NFR BLD AUTO: 10.3 %
NEUTROPHILS # BLD AUTO: 4.15 K/UL
NEUTROPHILS NFR BLD AUTO: 54.1 %
PLATELET # BLD AUTO: 319 K/UL
RBC # BLD: 4.86 M/UL
RBC # FLD: 13.8 %
VIT B12 SERPL-MCNC: 515 PG/ML
WBC # FLD AUTO: 7.68 K/UL

## 2022-08-03 LAB
A ALTERNATA IGE QN: <0.1 KUA/L
A ALTERNATA IGE QN: <0.1 KUA/L
A FUMIGATUS IGE QN: <0.1 KUA/L
A FUMIGATUS IGE QN: <0.1 KUA/L
BERMUDA GRASS IGE QN: <0.1 KUA/L
BOXELDER IGE QN: <0.1 KUA/L
C ALBICANS IGE QN: <0.1 KUA/L
C HERBARUM IGE QN: <0.1 KUA/L
C HERBARUM IGE QN: <0.1 KUA/L
CALIF WALNUT IGE QN: NORMAL
CAT DANDER IGE QN: <0.1 KUA/L
CAT DANDER IGE QN: <0.1 KUA/L
CLAM IGE QN: <0.1 KUA/L
CMN PIGWEED IGE QN: <0.1 KUA/L
CODFISH IGE QN: <0.1 KUA/L
COMMON RAGWEED IGE QN: <0.1 KUA/L
COMMON RAGWEED IGE QN: <0.1 KUA/L
CORN IGE QN: <0.1 KUA/L
COTTONWOOD IGE QN: <0.1 KUA/L
COW MILK IGE QN: <0.1 KUA/L
D FARINAE IGE QN: <0.1 KUA/L
D FARINAE IGE QN: <0.1 KUA/L
D PTERONYSS IGE QN: <0.1 KUA/L
D PTERONYSS IGE QN: <0.1 KUA/L
DEPRECATED A ALTERNATA IGE RAST QL: 0
DEPRECATED A ALTERNATA IGE RAST QL: 0
DEPRECATED A FUMIGATUS IGE RAST QL: 0
DEPRECATED A FUMIGATUS IGE RAST QL: 0
DEPRECATED BERMUDA GRASS IGE RAST QL: 0
DEPRECATED BOXELDER IGE RAST QL: 0
DEPRECATED C ALBICANS IGE RAST QL: 0
DEPRECATED C HERBARUM IGE RAST QL: 0
DEPRECATED C HERBARUM IGE RAST QL: 0
DEPRECATED CAT DANDER IGE RAST QL: 0
DEPRECATED CAT DANDER IGE RAST QL: 0
DEPRECATED CLAM IGE RAST QL: 0
DEPRECATED CODFISH IGE RAST QL: 0
DEPRECATED COMMON PIGWEED IGE RAST QL: 0
DEPRECATED COMMON RAGWEED IGE RAST QL: 0
DEPRECATED COMMON RAGWEED IGE RAST QL: 0
DEPRECATED CORN IGE RAST QL: 0
DEPRECATED COTTONWOOD IGE RAST QL: 0
DEPRECATED COW MILK IGE RAST QL: 0
DEPRECATED D FARINAE IGE RAST QL: 0
DEPRECATED D FARINAE IGE RAST QL: 0
DEPRECATED D PTERONYSS IGE RAST QL: 0
DEPRECATED D PTERONYSS IGE RAST QL: 0
DEPRECATED DOG DANDER IGE RAST QL: 0
DEPRECATED DOG DANDER IGE RAST QL: 0
DEPRECATED EGG WHITE IGE RAST QL: 0
DEPRECATED GOOSEFOOT IGE RAST QL: 0
DEPRECATED LONDON PLANE IGE RAST QL: NORMAL
DEPRECATED M RACEMOSUS IGE RAST QL: 0
DEPRECATED MOUSE URINE PROT IGE RAST QL: NORMAL
DEPRECATED MUGWORT IGE RAST QL: 0
DEPRECATED P NOTATUM IGE RAST QL: 0
DEPRECATED PEANUT IGE RAST QL: NORMAL
DEPRECATED RED CEDAR IGE RAST QL: NORMAL
DEPRECATED ROACH IGE RAST QL: 0
DEPRECATED ROACH IGE RAST QL: 0
DEPRECATED SCALLOP IGE RAST QL: NORMAL
DEPRECATED SESAME SEED IGE RAST QL: NORMAL
DEPRECATED SHEEP SORREL IGE RAST QL: NORMAL
DEPRECATED SHRIMP IGE RAST QL: NORMAL
DEPRECATED SILVER BIRCH IGE RAST QL: 0
DEPRECATED SOYBEAN IGE RAST QL: NORMAL
DEPRECATED TIMOTHY IGE RAST QL: NORMAL
DEPRECATED TIMOTHY IGE RAST QL: NORMAL
DEPRECATED WALNUT IGE RAST QL: NORMAL
DEPRECATED WHEAT IGE RAST QL: NORMAL
DEPRECATED WHITE ASH IGE RAST QL: NORMAL
DEPRECATED WHITE OAK IGE RAST QL: NORMAL
DEPRECATED WHITE OAK IGE RAST QL: NORMAL
DOG DANDER IGE QN: <0.1 KUA/L
DOG DANDER IGE QN: <0.1 KUA/L
EGG WHITE IGE QN: <0.1 KUA/L
GOOSEFOOT IGE QN: <0.1 KUA/L
LONDON PLANE IGE QN: NORMAL
M RACEMOSUS IGE QN: <0.1 KUA/L
MOUSE URINE PROT IGE QN: NORMAL
MUGWORT IGE QN: <0.1 KUA/L
MULBERRY (T70) CLASS: NORMAL
MULBERRY (T70) CONC: NORMAL
P NOTATUM IGE QN: <0.1 KUA/L
PEANUT IGE QN: NORMAL
RED CEDAR IGE QN: NORMAL
ROACH IGE QN: <0.1 KUA/L
ROACH IGE QN: <0.1 KUA/L
SCALLOP IGE QN: NORMAL
SCALLOP IGE QN: NORMAL
SESAME SEED IGE QN: NORMAL
SHEEP SORREL IGE QN: NORMAL
SILVER BIRCH IGE QN: <0.1 KUA/L
SOYBEAN IGE QN: NORMAL
TIMOTHY IGE QN: NORMAL
TIMOTHY IGE QN: NORMAL
TREE ALLERG MIX1 IGE QL: NORMAL
WALNUT IGE QN: NORMAL
WHEAT IGE QN: NORMAL
WHITE ASH IGE QN: NORMAL
WHITE ELM IGE QN: 0
WHITE ELM IGE QN: <0.1 KUA/L
WHITE OAK IGE QN: NORMAL
WHITE OAK IGE QN: NORMAL

## 2022-08-05 LAB
A FLAVUS AB FLD QL: NEGATIVE
A FUMIGATUS AB FLD QL: NEGATIVE
A NIGER AB FLD QL: NEGATIVE

## 2022-08-26 ENCOUNTER — APPOINTMENT (OUTPATIENT)
Dept: PULMONOLOGY | Facility: CLINIC | Age: 82
End: 2022-08-26

## 2022-08-26 VITALS
TEMPERATURE: 98 F | SYSTOLIC BLOOD PRESSURE: 172 MMHG | WEIGHT: 197 LBS | DIASTOLIC BLOOD PRESSURE: 71 MMHG | HEART RATE: 59 BPM | OXYGEN SATURATION: 94 %

## 2022-08-26 DIAGNOSIS — R06.2 WHEEZING: ICD-10-CM

## 2022-08-26 DIAGNOSIS — R41.0 DISORIENTATION, UNSPECIFIED: ICD-10-CM

## 2022-08-26 DIAGNOSIS — Z86.69 PERSONAL HISTORY OF OTHER DISEASES OF THE NERVOUS SYSTEM AND SENSE ORGANS: ICD-10-CM

## 2022-08-26 PROCEDURE — 94010 BREATHING CAPACITY TEST: CPT

## 2022-08-26 PROCEDURE — 99213 OFFICE O/P EST LOW 20 MIN: CPT | Mod: 25

## 2022-08-27 PROBLEM — Z86.69 H/O GUILLAIN-BARRE SYNDROME: Status: RESOLVED | Noted: 2022-08-27 | Resolved: 2022-08-27

## 2022-08-27 PROBLEM — R41.0 CONFUSION AND DISORIENTATION: Status: ACTIVE | Noted: 2022-07-21

## 2022-08-27 NOTE — ASSESSMENT
[FreeTextEntry1] : Appears to have component of asthma.  Clinically better- off inhalers\par overall doing better\par [id number] vaccine\par \par gave hx of Marcellus Terry synfrome- refused influena vaccination

## 2022-08-27 NOTE — PROCEDURE
[FreeTextEntry1] : Spirometry demonstrates obstructive pattern no specific bronchodilator response, interval improvement

## 2022-08-27 NOTE — HISTORY OF PRESENT ILLNESS
[TextBox_4] : Prior: Continues to have wheezing issues.  Wheezing issues primarily first waking up in the morning.  Walking badly says "his shoes are not good".  Recovering from fall still with recall issues regarding memory.  No acute urological complaint however still having incontinence issues.\par \par Current: feels better \par off inhalers\par

## 2022-12-09 ENCOUNTER — APPOINTMENT (OUTPATIENT)
Dept: PULMONOLOGY | Facility: CLINIC | Age: 82
End: 2022-12-09

## 2022-12-09 VITALS — HEART RATE: 69 BPM | SYSTOLIC BLOOD PRESSURE: 201 MMHG | DIASTOLIC BLOOD PRESSURE: 81 MMHG | OXYGEN SATURATION: 95 %

## 2022-12-09 DIAGNOSIS — L30.9 DERMATITIS, UNSPECIFIED: ICD-10-CM

## 2022-12-09 PROCEDURE — 99214 OFFICE O/P EST MOD 30 MIN: CPT

## 2022-12-09 RX ORDER — VALSARTAN 160 MG/1
160 TABLET, COATED ORAL
Qty: 30 | Refills: 0 | Status: DISCONTINUED | COMMUNITY
Start: 2022-05-12 | End: 2022-12-09

## 2022-12-10 NOTE — HISTORY OF PRESENT ILLNESS
[Never] : never [TextBox_4] : swelling R ankle\par \par doing PT\par \par Concerned about rash on legs.\par Medication review:\par Norvasc 5 mg daily\par add 5 mg s/tues/thur, sat pm\par toprol 50 bid daily\par \par

## 2022-12-10 NOTE — PHYSICAL EXAM
[No Acute Distress] : no acute distress [Normal Oropharynx] : normal oropharynx [Normal Appearance] : normal appearance [No Neck Mass] : no neck mass [Normal Rate/Rhythm] : normal rate/rhythm [Normal S1, S2] : normal s1, s2 [No Murmurs] : no murmurs [No Resp Distress] : no resp distress [Clear to Auscultation Bilaterally] : clear to auscultation bilaterally [No Abnormalities] : no abnormalities [Benign] : benign [Normal Gait] : normal gait [No Clubbing] : no clubbing [No Cyanosis] : no cyanosis [No Edema] : no edema [FROM] : FROM [No Focal Deficits] : no focal deficits [Oriented x3] : oriented x3 [Normal Affect] : normal affect [TextBox_125] : Reddish discoloration patches of both legs

## 2022-12-15 LAB
ALBUMIN SERPL ELPH-MCNC: 4.5 G/DL
ALP BLD-CCNC: 85 U/L
ALT SERPL-CCNC: 16 U/L
ANION GAP SERPL CALC-SCNC: 14 MMOL/L
AST SERPL-CCNC: 17 U/L
BASOPHILS # BLD AUTO: 0.11 K/UL
BASOPHILS NFR BLD AUTO: 1.2 %
BILIRUB SERPL-MCNC: 0.4 MG/DL
BUN SERPL-MCNC: 21 MG/DL
CALCIUM SERPL-MCNC: 9.7 MG/DL
CHLORIDE SERPL-SCNC: 101 MMOL/L
CHOLEST SERPL-MCNC: 192 MG/DL
CO2 SERPL-SCNC: 24 MMOL/L
CREAT SERPL-MCNC: 1.01 MG/DL
EGFR: 73 ML/MIN/1.73M2
EOSINOPHIL # BLD AUTO: 0.45 K/UL
EOSINOPHIL NFR BLD AUTO: 4.9 %
ESTIMATED AVERAGE GLUCOSE: 137 MG/DL
GLUCOSE SERPL-MCNC: 131 MG/DL
HBA1C MFR BLD HPLC: 6.4 %
HCT VFR BLD CALC: 44.3 %
HDLC SERPL-MCNC: 46 MG/DL
HGB BLD-MCNC: 14.2 G/DL
IMM GRANULOCYTES NFR BLD AUTO: 0.4 %
LDLC SERPL CALC-MCNC: 123 MG/DL
LYMPHOCYTES # BLD AUTO: 2.47 K/UL
LYMPHOCYTES NFR BLD AUTO: 26.8 %
MAN DIFF?: NORMAL
MCHC RBC-ENTMCNC: 29.2 PG
MCHC RBC-ENTMCNC: 32.1 GM/DL
MCV RBC AUTO: 91.2 FL
MONOCYTES # BLD AUTO: 0.97 K/UL
MONOCYTES NFR BLD AUTO: 10.5 %
NEUTROPHILS # BLD AUTO: 5.17 K/UL
NEUTROPHILS NFR BLD AUTO: 56.2 %
NONHDLC SERPL-MCNC: 147 MG/DL
PLATELET # BLD AUTO: 316 K/UL
POTASSIUM SERPL-SCNC: 4.6 MMOL/L
PROT SERPL-MCNC: 7.3 G/DL
RBC # BLD: 4.86 M/UL
RBC # FLD: 14.5 %
SODIUM SERPL-SCNC: 138 MMOL/L
TRIGL SERPL-MCNC: 117 MG/DL
WBC # FLD AUTO: 9.21 K/UL

## 2023-12-02 ENCOUNTER — NON-APPOINTMENT (OUTPATIENT)
Age: 83
End: 2023-12-02

## 2023-12-03 ENCOUNTER — INPATIENT (INPATIENT)
Facility: HOSPITAL | Age: 83
LOS: 4 days | Discharge: NURSING, REHAB OR LONG-TERM CARE | DRG: 871 | End: 2023-12-08
Attending: STUDENT IN AN ORGANIZED HEALTH CARE EDUCATION/TRAINING PROGRAM | Admitting: STUDENT IN AN ORGANIZED HEALTH CARE EDUCATION/TRAINING PROGRAM
Payer: MEDICARE

## 2023-12-03 VITALS
RESPIRATION RATE: 22 BRPM | DIASTOLIC BLOOD PRESSURE: 110 MMHG | HEART RATE: 110 BPM | TEMPERATURE: 101 F | SYSTOLIC BLOOD PRESSURE: 213 MMHG | HEIGHT: 66 IN | WEIGHT: 220.02 LBS | OXYGEN SATURATION: 100 %

## 2023-12-03 DIAGNOSIS — A41.9 SEPSIS, UNSPECIFIED ORGANISM: ICD-10-CM
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
## 2023-12-03 DIAGNOSIS — Z90.79 ACQUIRED ABSENCE OF OTHER GENITAL ORGAN(S): Chronic | ICD-10-CM

## 2023-12-03 DIAGNOSIS — Z29.9 ENCOUNTER FOR PROPHYLACTIC MEASURES, UNSPECIFIED: ICD-10-CM

## 2023-12-03 DIAGNOSIS — U07.1 COVID-19: ICD-10-CM

## 2023-12-03 DIAGNOSIS — I10 ESSENTIAL (PRIMARY) HYPERTENSION: ICD-10-CM

## 2023-12-03 LAB
ALBUMIN SERPL ELPH-MCNC: 4.4 G/DL — SIGNIFICANT CHANGE UP (ref 3.3–5)
ALBUMIN SERPL ELPH-MCNC: 4.4 G/DL — SIGNIFICANT CHANGE UP (ref 3.3–5)
ALP SERPL-CCNC: 85 U/L — SIGNIFICANT CHANGE UP (ref 40–120)
ALP SERPL-CCNC: 85 U/L — SIGNIFICANT CHANGE UP (ref 40–120)
ALT FLD-CCNC: 20 U/L — SIGNIFICANT CHANGE UP (ref 10–45)
ALT FLD-CCNC: 20 U/L — SIGNIFICANT CHANGE UP (ref 10–45)
ANION GAP SERPL CALC-SCNC: 14 MMOL/L — SIGNIFICANT CHANGE UP (ref 5–17)
ANION GAP SERPL CALC-SCNC: 14 MMOL/L — SIGNIFICANT CHANGE UP (ref 5–17)
APPEARANCE UR: CLEAR — SIGNIFICANT CHANGE UP
APPEARANCE UR: CLEAR — SIGNIFICANT CHANGE UP
APTT BLD: 28.7 SEC — SIGNIFICANT CHANGE UP (ref 24.5–35.6)
APTT BLD: 28.7 SEC — SIGNIFICANT CHANGE UP (ref 24.5–35.6)
AST SERPL-CCNC: 48 U/L — HIGH (ref 10–40)
AST SERPL-CCNC: 48 U/L — HIGH (ref 10–40)
BACTERIA # UR AUTO: NEGATIVE /HPF — SIGNIFICANT CHANGE UP
BACTERIA # UR AUTO: NEGATIVE /HPF — SIGNIFICANT CHANGE UP
BASE EXCESS BLDV CALC-SCNC: 4.2 MMOL/L — HIGH (ref -2–3)
BASE EXCESS BLDV CALC-SCNC: 4.2 MMOL/L — HIGH (ref -2–3)
BASE EXCESS BLDV CALC-SCNC: 6.5 MMOL/L — HIGH (ref -2–3)
BASE EXCESS BLDV CALC-SCNC: 6.5 MMOL/L — HIGH (ref -2–3)
BASOPHILS # BLD AUTO: 0.08 K/UL — SIGNIFICANT CHANGE UP (ref 0–0.2)
BASOPHILS # BLD AUTO: 0.08 K/UL — SIGNIFICANT CHANGE UP (ref 0–0.2)
BASOPHILS NFR BLD AUTO: 0.6 % — SIGNIFICANT CHANGE UP (ref 0–2)
BASOPHILS NFR BLD AUTO: 0.6 % — SIGNIFICANT CHANGE UP (ref 0–2)
BILIRUB SERPL-MCNC: 0.7 MG/DL — SIGNIFICANT CHANGE UP (ref 0.2–1.2)
BILIRUB SERPL-MCNC: 0.7 MG/DL — SIGNIFICANT CHANGE UP (ref 0.2–1.2)
BILIRUB UR-MCNC: NEGATIVE — SIGNIFICANT CHANGE UP
BILIRUB UR-MCNC: NEGATIVE — SIGNIFICANT CHANGE UP
BUN SERPL-MCNC: 18 MG/DL — SIGNIFICANT CHANGE UP (ref 7–23)
BUN SERPL-MCNC: 18 MG/DL — SIGNIFICANT CHANGE UP (ref 7–23)
CA-I SERPL-SCNC: 1.13 MMOL/L — LOW (ref 1.15–1.33)
CA-I SERPL-SCNC: 1.13 MMOL/L — LOW (ref 1.15–1.33)
CA-I SERPL-SCNC: 1.14 MMOL/L — LOW (ref 1.15–1.33)
CA-I SERPL-SCNC: 1.14 MMOL/L — LOW (ref 1.15–1.33)
CALCIUM SERPL-MCNC: 9.6 MG/DL — SIGNIFICANT CHANGE UP (ref 8.4–10.5)
CALCIUM SERPL-MCNC: 9.6 MG/DL — SIGNIFICANT CHANGE UP (ref 8.4–10.5)
CAST: 1 /LPF — SIGNIFICANT CHANGE UP (ref 0–4)
CAST: 1 /LPF — SIGNIFICANT CHANGE UP (ref 0–4)
CHLORIDE BLDV-SCNC: 101 MMOL/L — SIGNIFICANT CHANGE UP (ref 96–108)
CHLORIDE BLDV-SCNC: 101 MMOL/L — SIGNIFICANT CHANGE UP (ref 96–108)
CHLORIDE BLDV-SCNC: 97 MMOL/L — SIGNIFICANT CHANGE UP (ref 96–108)
CHLORIDE BLDV-SCNC: 97 MMOL/L — SIGNIFICANT CHANGE UP (ref 96–108)
CHLORIDE SERPL-SCNC: 97 MMOL/L — SIGNIFICANT CHANGE UP (ref 96–108)
CHLORIDE SERPL-SCNC: 97 MMOL/L — SIGNIFICANT CHANGE UP (ref 96–108)
CO2 BLDV-SCNC: 32 MMOL/L — HIGH (ref 22–26)
CO2 BLDV-SCNC: 32 MMOL/L — HIGH (ref 22–26)
CO2 BLDV-SCNC: 33 MMOL/L — HIGH (ref 22–26)
CO2 BLDV-SCNC: 33 MMOL/L — HIGH (ref 22–26)
CO2 SERPL-SCNC: 23 MMOL/L — SIGNIFICANT CHANGE UP (ref 22–31)
CO2 SERPL-SCNC: 23 MMOL/L — SIGNIFICANT CHANGE UP (ref 22–31)
COLOR SPEC: YELLOW — SIGNIFICANT CHANGE UP
COLOR SPEC: YELLOW — SIGNIFICANT CHANGE UP
CREAT SERPL-MCNC: 0.8 MG/DL — SIGNIFICANT CHANGE UP (ref 0.5–1.3)
CREAT SERPL-MCNC: 0.8 MG/DL — SIGNIFICANT CHANGE UP (ref 0.5–1.3)
DIFF PNL FLD: ABNORMAL
DIFF PNL FLD: ABNORMAL
EGFR: 87 ML/MIN/1.73M2 — SIGNIFICANT CHANGE UP
EGFR: 87 ML/MIN/1.73M2 — SIGNIFICANT CHANGE UP
EOSINOPHIL # BLD AUTO: 0.02 K/UL — SIGNIFICANT CHANGE UP (ref 0–0.5)
EOSINOPHIL # BLD AUTO: 0.02 K/UL — SIGNIFICANT CHANGE UP (ref 0–0.5)
EOSINOPHIL NFR BLD AUTO: 0.2 % — SIGNIFICANT CHANGE UP (ref 0–6)
EOSINOPHIL NFR BLD AUTO: 0.2 % — SIGNIFICANT CHANGE UP (ref 0–6)
FLUAV AG NPH QL: SIGNIFICANT CHANGE UP
FLUAV AG NPH QL: SIGNIFICANT CHANGE UP
FLUBV AG NPH QL: SIGNIFICANT CHANGE UP
FLUBV AG NPH QL: SIGNIFICANT CHANGE UP
GAS PNL BLDV: 130 MMOL/L — LOW (ref 136–145)
GAS PNL BLDV: 130 MMOL/L — LOW (ref 136–145)
GAS PNL BLDV: 136 MMOL/L — SIGNIFICANT CHANGE UP (ref 136–145)
GAS PNL BLDV: 136 MMOL/L — SIGNIFICANT CHANGE UP (ref 136–145)
GAS PNL BLDV: SIGNIFICANT CHANGE UP
GLUCOSE BLDV-MCNC: 151 MG/DL — HIGH (ref 70–99)
GLUCOSE BLDV-MCNC: 151 MG/DL — HIGH (ref 70–99)
GLUCOSE BLDV-MCNC: 165 MG/DL — HIGH (ref 70–99)
GLUCOSE BLDV-MCNC: 165 MG/DL — HIGH (ref 70–99)
GLUCOSE SERPL-MCNC: 156 MG/DL — HIGH (ref 70–99)
GLUCOSE SERPL-MCNC: 156 MG/DL — HIGH (ref 70–99)
GLUCOSE UR QL: NEGATIVE MG/DL — SIGNIFICANT CHANGE UP
GLUCOSE UR QL: NEGATIVE MG/DL — SIGNIFICANT CHANGE UP
HCO3 BLDV-SCNC: 30 MMOL/L — HIGH (ref 22–29)
HCO3 BLDV-SCNC: 30 MMOL/L — HIGH (ref 22–29)
HCO3 BLDV-SCNC: 32 MMOL/L — HIGH (ref 22–29)
HCO3 BLDV-SCNC: 32 MMOL/L — HIGH (ref 22–29)
HCT VFR BLD CALC: 45 % — SIGNIFICANT CHANGE UP (ref 39–50)
HCT VFR BLD CALC: 45 % — SIGNIFICANT CHANGE UP (ref 39–50)
HCT VFR BLDA CALC: 41 % — SIGNIFICANT CHANGE UP (ref 39–51)
HCT VFR BLDA CALC: 41 % — SIGNIFICANT CHANGE UP (ref 39–51)
HCT VFR BLDA CALC: 46 % — SIGNIFICANT CHANGE UP (ref 39–51)
HCT VFR BLDA CALC: 46 % — SIGNIFICANT CHANGE UP (ref 39–51)
HGB BLD CALC-MCNC: 13.7 G/DL — SIGNIFICANT CHANGE UP (ref 12.6–17.4)
HGB BLD CALC-MCNC: 13.7 G/DL — SIGNIFICANT CHANGE UP (ref 12.6–17.4)
HGB BLD CALC-MCNC: 15.2 G/DL — SIGNIFICANT CHANGE UP (ref 12.6–17.4)
HGB BLD CALC-MCNC: 15.2 G/DL — SIGNIFICANT CHANGE UP (ref 12.6–17.4)
HGB BLD-MCNC: 15.2 G/DL — SIGNIFICANT CHANGE UP (ref 13–17)
HGB BLD-MCNC: 15.2 G/DL — SIGNIFICANT CHANGE UP (ref 13–17)
IMM GRANULOCYTES NFR BLD AUTO: 0.9 % — SIGNIFICANT CHANGE UP (ref 0–0.9)
IMM GRANULOCYTES NFR BLD AUTO: 0.9 % — SIGNIFICANT CHANGE UP (ref 0–0.9)
INR BLD: 1.12 RATIO — SIGNIFICANT CHANGE UP (ref 0.85–1.18)
INR BLD: 1.12 RATIO — SIGNIFICANT CHANGE UP (ref 0.85–1.18)
KETONES UR-MCNC: NEGATIVE MG/DL — SIGNIFICANT CHANGE UP
KETONES UR-MCNC: NEGATIVE MG/DL — SIGNIFICANT CHANGE UP
LACTATE BLDV-MCNC: 1.4 MMOL/L — SIGNIFICANT CHANGE UP (ref 0.5–2)
LACTATE BLDV-MCNC: 1.4 MMOL/L — SIGNIFICANT CHANGE UP (ref 0.5–2)
LACTATE BLDV-MCNC: 2.5 MMOL/L — HIGH (ref 0.5–2)
LACTATE BLDV-MCNC: 2.5 MMOL/L — HIGH (ref 0.5–2)
LEUKOCYTE ESTERASE UR-ACNC: NEGATIVE — SIGNIFICANT CHANGE UP
LEUKOCYTE ESTERASE UR-ACNC: NEGATIVE — SIGNIFICANT CHANGE UP
LYMPHOCYTES # BLD AUTO: 0.64 K/UL — LOW (ref 1–3.3)
LYMPHOCYTES # BLD AUTO: 0.64 K/UL — LOW (ref 1–3.3)
LYMPHOCYTES # BLD AUTO: 4.8 % — LOW (ref 13–44)
LYMPHOCYTES # BLD AUTO: 4.8 % — LOW (ref 13–44)
MCHC RBC-ENTMCNC: 30 PG — SIGNIFICANT CHANGE UP (ref 27–34)
MCHC RBC-ENTMCNC: 30 PG — SIGNIFICANT CHANGE UP (ref 27–34)
MCHC RBC-ENTMCNC: 33.8 GM/DL — SIGNIFICANT CHANGE UP (ref 32–36)
MCHC RBC-ENTMCNC: 33.8 GM/DL — SIGNIFICANT CHANGE UP (ref 32–36)
MCV RBC AUTO: 88.9 FL — SIGNIFICANT CHANGE UP (ref 80–100)
MCV RBC AUTO: 88.9 FL — SIGNIFICANT CHANGE UP (ref 80–100)
MONOCYTES # BLD AUTO: 1.22 K/UL — HIGH (ref 0–0.9)
MONOCYTES # BLD AUTO: 1.22 K/UL — HIGH (ref 0–0.9)
MONOCYTES NFR BLD AUTO: 9.2 % — SIGNIFICANT CHANGE UP (ref 2–14)
MONOCYTES NFR BLD AUTO: 9.2 % — SIGNIFICANT CHANGE UP (ref 2–14)
NEUTROPHILS # BLD AUTO: 11.22 K/UL — HIGH (ref 1.8–7.4)
NEUTROPHILS # BLD AUTO: 11.22 K/UL — HIGH (ref 1.8–7.4)
NEUTROPHILS NFR BLD AUTO: 84.3 % — HIGH (ref 43–77)
NEUTROPHILS NFR BLD AUTO: 84.3 % — HIGH (ref 43–77)
NITRITE UR-MCNC: NEGATIVE — SIGNIFICANT CHANGE UP
NITRITE UR-MCNC: NEGATIVE — SIGNIFICANT CHANGE UP
NRBC # BLD: 0 /100 WBCS — SIGNIFICANT CHANGE UP (ref 0–0)
NRBC # BLD: 0 /100 WBCS — SIGNIFICANT CHANGE UP (ref 0–0)
PCO2 BLDV: 47 MMHG — SIGNIFICANT CHANGE UP (ref 42–55)
PCO2 BLDV: 47 MMHG — SIGNIFICANT CHANGE UP (ref 42–55)
PCO2 BLDV: 50 MMHG — SIGNIFICANT CHANGE UP (ref 42–55)
PCO2 BLDV: 50 MMHG — SIGNIFICANT CHANGE UP (ref 42–55)
PH BLDV: 7.39 — SIGNIFICANT CHANGE UP (ref 7.32–7.43)
PH BLDV: 7.39 — SIGNIFICANT CHANGE UP (ref 7.32–7.43)
PH BLDV: 7.44 — HIGH (ref 7.32–7.43)
PH BLDV: 7.44 — HIGH (ref 7.32–7.43)
PH UR: 7 — SIGNIFICANT CHANGE UP (ref 5–8)
PH UR: 7 — SIGNIFICANT CHANGE UP (ref 5–8)
PLATELET # BLD AUTO: 280 K/UL — SIGNIFICANT CHANGE UP (ref 150–400)
PLATELET # BLD AUTO: 280 K/UL — SIGNIFICANT CHANGE UP (ref 150–400)
PO2 BLDV: 47 MMHG — HIGH (ref 25–45)
PO2 BLDV: 47 MMHG — HIGH (ref 25–45)
PO2 BLDV: 51 MMHG — HIGH (ref 25–45)
PO2 BLDV: 51 MMHG — HIGH (ref 25–45)
POTASSIUM BLDV-SCNC: 3.1 MMOL/L — LOW (ref 3.5–5.1)
POTASSIUM BLDV-SCNC: 3.1 MMOL/L — LOW (ref 3.5–5.1)
POTASSIUM BLDV-SCNC: 4.3 MMOL/L — SIGNIFICANT CHANGE UP (ref 3.5–5.1)
POTASSIUM BLDV-SCNC: 4.3 MMOL/L — SIGNIFICANT CHANGE UP (ref 3.5–5.1)
POTASSIUM SERPL-MCNC: 4.6 MMOL/L — SIGNIFICANT CHANGE UP (ref 3.5–5.3)
POTASSIUM SERPL-MCNC: 4.6 MMOL/L — SIGNIFICANT CHANGE UP (ref 3.5–5.3)
POTASSIUM SERPL-SCNC: 4.6 MMOL/L — SIGNIFICANT CHANGE UP (ref 3.5–5.3)
POTASSIUM SERPL-SCNC: 4.6 MMOL/L — SIGNIFICANT CHANGE UP (ref 3.5–5.3)
PROT SERPL-MCNC: 7.7 G/DL — SIGNIFICANT CHANGE UP (ref 6–8.3)
PROT SERPL-MCNC: 7.7 G/DL — SIGNIFICANT CHANGE UP (ref 6–8.3)
PROT UR-MCNC: 100 MG/DL
PROT UR-MCNC: 100 MG/DL
PROTHROM AB SERPL-ACNC: 12.3 SEC — SIGNIFICANT CHANGE UP (ref 9.5–13)
PROTHROM AB SERPL-ACNC: 12.3 SEC — SIGNIFICANT CHANGE UP (ref 9.5–13)
RBC # BLD: 5.06 M/UL — SIGNIFICANT CHANGE UP (ref 4.2–5.8)
RBC # BLD: 5.06 M/UL — SIGNIFICANT CHANGE UP (ref 4.2–5.8)
RBC # FLD: 14.3 % — SIGNIFICANT CHANGE UP (ref 10.3–14.5)
RBC # FLD: 14.3 % — SIGNIFICANT CHANGE UP (ref 10.3–14.5)
RBC CASTS # UR COMP ASSIST: 2 /HPF — SIGNIFICANT CHANGE UP (ref 0–4)
RBC CASTS # UR COMP ASSIST: 2 /HPF — SIGNIFICANT CHANGE UP (ref 0–4)
REVIEW: SIGNIFICANT CHANGE UP
REVIEW: SIGNIFICANT CHANGE UP
RSV RNA NPH QL NAA+NON-PROBE: SIGNIFICANT CHANGE UP
RSV RNA NPH QL NAA+NON-PROBE: SIGNIFICANT CHANGE UP
SAO2 % BLDV: 76.8 % — SIGNIFICANT CHANGE UP (ref 67–88)
SAO2 % BLDV: 76.8 % — SIGNIFICANT CHANGE UP (ref 67–88)
SAO2 % BLDV: 78.5 % — SIGNIFICANT CHANGE UP (ref 67–88)
SAO2 % BLDV: 78.5 % — SIGNIFICANT CHANGE UP (ref 67–88)
SARS-COV-2 RNA SPEC QL NAA+PROBE: DETECTED
SARS-COV-2 RNA SPEC QL NAA+PROBE: DETECTED
SODIUM SERPL-SCNC: 134 MMOL/L — LOW (ref 135–145)
SODIUM SERPL-SCNC: 134 MMOL/L — LOW (ref 135–145)
SP GR SPEC: 1.02 — SIGNIFICANT CHANGE UP (ref 1–1.03)
SP GR SPEC: 1.02 — SIGNIFICANT CHANGE UP (ref 1–1.03)
SQUAMOUS # UR AUTO: 3 /HPF — SIGNIFICANT CHANGE UP (ref 0–5)
SQUAMOUS # UR AUTO: 3 /HPF — SIGNIFICANT CHANGE UP (ref 0–5)
UROBILINOGEN FLD QL: 0.2 MG/DL — SIGNIFICANT CHANGE UP (ref 0.2–1)
UROBILINOGEN FLD QL: 0.2 MG/DL — SIGNIFICANT CHANGE UP (ref 0.2–1)
WBC # BLD: 13.3 K/UL — HIGH (ref 3.8–10.5)
WBC # BLD: 13.3 K/UL — HIGH (ref 3.8–10.5)
WBC # FLD AUTO: 13.3 K/UL — HIGH (ref 3.8–10.5)
WBC # FLD AUTO: 13.3 K/UL — HIGH (ref 3.8–10.5)
WBC UR QL: 2 /HPF — SIGNIFICANT CHANGE UP (ref 0–5)
WBC UR QL: 2 /HPF — SIGNIFICANT CHANGE UP (ref 0–5)

## 2023-12-03 PROCEDURE — 71045 X-RAY EXAM CHEST 1 VIEW: CPT | Mod: 26

## 2023-12-03 PROCEDURE — 99223 1ST HOSP IP/OBS HIGH 75: CPT | Mod: GC

## 2023-12-03 PROCEDURE — 99285 EMERGENCY DEPT VISIT HI MDM: CPT | Mod: GC

## 2023-12-03 RX ORDER — GUAIFENESIN/DEXTROMETHORPHAN 600MG-30MG
10 TABLET, EXTENDED RELEASE 12 HR ORAL EVERY 4 HOURS
Refills: 0 | Status: DISCONTINUED | OUTPATIENT
Start: 2023-12-03 | End: 2023-12-08

## 2023-12-03 RX ORDER — ONDANSETRON 8 MG/1
4 TABLET, FILM COATED ORAL EVERY 8 HOURS
Refills: 0 | Status: DISCONTINUED | OUTPATIENT
Start: 2023-12-03 | End: 2023-12-08

## 2023-12-03 RX ORDER — ACETAMINOPHEN 500 MG
650 TABLET ORAL EVERY 6 HOURS
Refills: 0 | Status: DISCONTINUED | OUTPATIENT
Start: 2023-12-03 | End: 2023-12-08

## 2023-12-03 RX ORDER — CEFTRIAXONE 500 MG/1
1000 INJECTION, POWDER, FOR SOLUTION INTRAMUSCULAR; INTRAVENOUS EVERY 24 HOURS
Refills: 0 | Status: DISCONTINUED | OUTPATIENT
Start: 2023-12-04 | End: 2023-12-04

## 2023-12-03 RX ORDER — CEFTRIAXONE 500 MG/1
1000 INJECTION, POWDER, FOR SOLUTION INTRAMUSCULAR; INTRAVENOUS ONCE
Refills: 0 | Status: COMPLETED | OUTPATIENT
Start: 2023-12-03 | End: 2023-12-03

## 2023-12-03 RX ORDER — SODIUM CHLORIDE 9 MG/ML
1000 INJECTION, SOLUTION INTRAVENOUS
Refills: 0 | Status: DISCONTINUED | OUTPATIENT
Start: 2023-12-03 | End: 2023-12-04

## 2023-12-03 RX ORDER — AZITHROMYCIN 500 MG/1
500 TABLET, FILM COATED ORAL ONCE
Refills: 0 | Status: COMPLETED | OUTPATIENT
Start: 2023-12-03 | End: 2023-12-03

## 2023-12-03 RX ORDER — LANOLIN ALCOHOL/MO/W.PET/CERES
3 CREAM (GRAM) TOPICAL AT BEDTIME
Refills: 0 | Status: DISCONTINUED | OUTPATIENT
Start: 2023-12-03 | End: 2023-12-08

## 2023-12-03 RX ORDER — ACETAMINOPHEN 500 MG
1000 TABLET ORAL ONCE
Refills: 0 | Status: COMPLETED | OUTPATIENT
Start: 2023-12-03 | End: 2023-12-03

## 2023-12-03 RX ORDER — SODIUM CHLORIDE 9 MG/ML
2000 INJECTION INTRAMUSCULAR; INTRAVENOUS; SUBCUTANEOUS ONCE
Refills: 0 | Status: COMPLETED | OUTPATIENT
Start: 2023-12-03 | End: 2023-12-03

## 2023-12-03 RX ADMIN — CEFTRIAXONE 100 MILLIGRAM(S): 500 INJECTION, POWDER, FOR SOLUTION INTRAMUSCULAR; INTRAVENOUS at 20:35

## 2023-12-03 RX ADMIN — AZITHROMYCIN 255 MILLIGRAM(S): 500 TABLET, FILM COATED ORAL at 22:05

## 2023-12-03 RX ADMIN — Medication 400 MILLIGRAM(S): at 18:55

## 2023-12-03 RX ADMIN — SODIUM CHLORIDE 2000 MILLILITER(S): 9 INJECTION INTRAMUSCULAR; INTRAVENOUS; SUBCUTANEOUS at 18:55

## 2023-12-03 NOTE — ED PROVIDER NOTE - CLINICAL SUMMARY MEDICAL DECISION MAKING FREE TEXT BOX
83M h/o HTN p/w fever and AMS beginning this AM, as well as unwitnessed fall. Possibly had some SOB. Wife had COVID last week. Met sepsis criteria here. Clinical picture highly suggestive of infectious etiology. Will obtain full sepsis workup, fluid bolus, CXR, COVID swab. Will also obtain CTH and c-spine iso unwitnessed fall. Statement Selected

## 2023-12-03 NOTE — ED PROVIDER NOTE - ATTENDING CONTRIBUTION TO CARE
83M h/o HTN p/w fever, AMS that began today and fell back and hit head with no loc but unwitnessed likely encephalopathic due to infection with high fever 101.5 last time this occurred as per daughter who is a neurologist it was due to uti but wife at home with covid here satting in 80s no cough no sob, on nc, ct head and neck but nc/at, non focal, moving all extremities, ekg, sepsis work up. no changes to ekg

## 2023-12-03 NOTE — H&P ADULT - NSHPREVIEWOFSYSTEMS_GEN_ALL_CORE
REVIEW OF SYSTEMS:    CONSTITUTIONAL: No weakness, fevers or chills  EYES/ENT: No visual changes;  No vertigo or throat pain   NECK: No pain or stiffness  RESPIRATORY: No cough, wheezing, hemoptysis; No shortness of breath, + non productive cough  CARDIOVASCULAR: No chest pain or palpitations  GASTROINTESTINAL: No abdominal or epigastric pain. No nausea, vomiting, or hematemesis; No diarrhea or constipation. No melena or hematochezia.  GENITOURINARY: No dysuria, frequency or hematuria  NEUROLOGICAL: No numbness or weakness  All other review of systems is negative unless indicated above.

## 2023-12-03 NOTE — H&P ADULT - TIME BILLING
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

## 2023-12-03 NOTE — H&P ADULT - ATTENDING COMMENTS
I have reviewed the labs, imaging and ekg. CXR w/ slight L sided opacity. EKG with sinus tachycardia  QTc 446 RBBB    83M w/ PMHx significant for HTN and BPH s/p TURP p/w fall found to have sepsis likely 2/2 to COVID. Given co-morbidities will cont. to cover for bacterial infection. Wife states ankle edema has been chronic. Satting >96% on RA on my exam  -Cont. IV Ceftriaxone and azithromycin  -F/u BCxs, Urine legionella  -Cont. BP meds with hold parameters for now  -Wife okay with ordering IV Remdesivir for now, is going to f/u with family  -Isolation precautions  -Supportive care  -Procalcitonin, BNP  -DVT PPx, Lovenox

## 2023-12-03 NOTE — H&P ADULT - NSHPLABSRESULTS_GEN_ALL_CORE
LABS:                          15.2   13.30 )-----------( 280      ( 03 Dec 2023 18:48 )             45.0     12-03    134<L>  |  97  |  18  ----------------------------<  156<H>  4.6   |  23  |  0.80    Ca    9.6      03 Dec 2023 18:48    TPro  7.7  /  Alb  4.4  /  TBili  0.7  /  DBili  x   /  AST  48<H>  /  ALT  20  /  AlkPhos  85  12-03    LIVER FUNCTIONS - ( 03 Dec 2023 18:48 )  Alb: 4.4 g/dL / Pro: 7.7 g/dL / ALK PHOS: 85 U/L / ALT: 20 U/L / AST: 48 U/L / GGT: x           PT/INR - ( 03 Dec 2023 18:48 )   PT: 12.3 sec;   INR: 1.12 ratio         PTT - ( 03 Dec 2023 18:48 )  PTT:28.7 sec  Urinalysis Basic - ( 03 Dec 2023 19:15 )    Color: Yellow / Appearance: Clear / S.018 / pH: x  Gluc: x / Ketone: Negative mg/dL  / Bili: Negative / Urobili: 0.2 mg/dL   Blood: x / Protein: 100 mg/dL / Nitrite: Negative   Leuk Esterase: Negative / RBC: 2 /HPF / WBC 2 /HPF   Sq Epi: x / Non Sq Epi: 3 /HPF / Bacteria: Negative /HPF

## 2023-12-03 NOTE — ED ADULT NURSE NOTE - NSFALLHARMRISKINTERV_ED_ALL_ED
Communicate risk of Fall with Harm to all staff, patient, and family/Provide visual cue: red socks, yellow wristband, yellow gown, etc/Reinforce activity limits and safety measures with patient and family/Bed in lowest position, wheels locked, appropriate side rails in place/Call bell, personal items and telephone in reach/Instruct patient to call for assistance before getting out of bed/chair/stretcher/Non-slip footwear applied when patient is off stretcher/[place] to call system/Physically safe environment - no spills, clutter or unnecessary equipment/Purposeful Proactive Rounding/Room/bathroom lighting operational, light cord in reach Communicate risk of Fall with Harm to all staff, patient, and family/Provide visual cue: red socks, yellow wristband, yellow gown, etc/Reinforce activity limits and safety measures with patient and family/Bed in lowest position, wheels locked, appropriate side rails in place/Call bell, personal items and telephone in reach/Instruct patient to call for assistance before getting out of bed/chair/stretcher/Non-slip footwear applied when patient is off stretcher/[place] to call system/Physically safe environment - no spills, clutter or unnecessary equipment/Purposeful Proactive Rounding/Room/bathroom lighting operational, light cord in reach

## 2023-12-03 NOTE — H&P ADULT - ASSESSMENT
83 year old male with PMH significant for HTN and BPH s/p TURP who is being admitted after a mechanical fall likely due to weakness 2/2 to Covid PNA sepsis

## 2023-12-03 NOTE — H&P ADULT - PROBLEM SELECTOR PROBLEM 4
General Discharge Instructions   If you were prescribed a narcotic or controlled medication, do not drive or operate heavy equipment or machinery while taking these medications.  If you were prescribed antibiotics, please take them to completion.  You must understand that you've received an Urgent Care treatment only and that you may be released before all your medical problems are known or treated. You, the patient, will arrange for follow up care as instructed.  Follow up with your PCP or specialty clinic as directed in the next 1-2 weeks if not improved or as needed.  You can call [phone] to schedule an appointment with the appropriate provider.  If your condition worsens we recommend that you receive another evaluation at the emergency room immediately or contact your primary medical clinics after hours call service to discuss your concerns.  Please return here or go to the Emergency Department for any concerns or worsening of condition.  Patient Education       Temporomandibular Joint (TMJ) Disorders Discharge Instructions   About this topic   The temporomandibular joint is also called the TMJ. It is the joint in front of your ear. It connects your lower jaw to the side of your head. This joint is flexible and lets the jaw move up and down and side to side so you can talk, chew, and yawn. When you have a problem with your jaw, jaw joint, or other facial muscles you have temporomandibular disorder or TMD. Injury to your jaw, grinding or clenching your teeth, arthritis, and stress are some of the causes of TMD. Doctors treat this problem with drugs, mouth guards, and therapy. Sometimes, surgery is needed to remove fluid in the joint.  What care is needed at home?   · Ask your doctor what you need to do when you go home. Make sure you ask questions if you do not understand what the doctor says. This way you will know what you need to do.  · Eat soft foods to lessen the pain in your jaw.  · Place an ice pack  or a bag of frozen peas wrapped in a towel over the painful part. Never put ice right on your skin. Do not leave the ice on for more than 10 to 15 minutes at a time.  · Avoid strong movements of your jaw like wide yawning, loud singing, and gum chewing.  · Your doctor may teach you ways to relax and lower stress.  · Do jaw stretching and relaxing exercises to help with jaw movement.  · Wear a bite guard to lessen the effects of clenching or grinding your teeth.  · Visit your dentist to check for any problems with your teeth.  · Keep your teeth slightly apart to lessen pressure on your jaw.  If fluid was removed from the joint:  · Clean and change your bandage as told by your doctor.  · Ask your doctor when you may go back to your normal activities like work or driving.  · Ask your doctor if you have any dietary restrictions after your procedure.  What follow-up care is needed?   · Your doctor may ask you to make visits to the office to check on your progress. Be sure to keep these visits.  · Your doctor may have you see a therapist.  What drugs may be needed?   The doctor may order drugs to:  · Help with pain and swelling  · Help to relax your muscles  Will physical activity be limited?   · You may have to limit your activities. Talk to your doctor about the right amount of activity for you.  · Do not play sports that might cause more harm to your jaw. Ask your doctor when it is safe to play sports.  What can be done to prevent this health problem?   · Use protective headgear like helmets or mouthguards when playing sports.  · Wear a helmet when skiing, snowboarding, biking, riding a motorcycle, and roller blading.  · Always wear your seat belt.  When do I need to call the doctor?   · Fever  · Very bad pain or swelling  · Splint or mouth guard is out of place, broken, or damaged  · Trouble swallowing, speaking, breathing, eating, or drinking  · Trouble opening or closing your mouth  Teach Back: Helping You  Understand   The Teach Back Method helps you understand the information we are giving you. After you talk with the staff, tell them in your own words what you learned. This helps to make sure the staff has described each thing clearly. It also helps to explain things that may have been confusing. Before going home, make sure you can do these:  · I can tell you about my condition.  · I can tell you what may help ease my pain.  · I can tell you what I will do if I have more pain; trouble swallowing, speaking, breathing, eating, or drinking; or problems opening or closing my mouth.  Where can I learn more?   [employer] [place] [employer]  https://www.nidcr.nih.gov/health-info/tmj/more-info   National Institutes of [facility]  https://www.nidcr.nih.gov/sites/default/files/2017-12/tmj-disorders.pdf   Last Reviewed Date   2020-10-12  Consumer Information Use and Disclaimer   This information is not specific medical advice and does not replace information you receive from your health care provider. This is only a brief summary of general information. It does NOT include all information about conditions, illnesses, injuries, tests, procedures, treatments, therapies, discharge instructions or life-style choices that may apply to you. You must talk with your health care provider for complete information about your health and treatment options. This information should not be used to decide whether or not to accept your health care providers advice, instructions or recommendations. Only your health care provider has the knowledge and training to provide advice that is right for you.  Copyright   Copyright © 2021 Up[employer], [employer] and its affiliates and/or licensors. All rights reserved.     Need for prophylactic measure

## 2023-12-03 NOTE — ED ADULT NURSE NOTE - NSFALLCONCLUSION_ED_ALL_ED
Routing refill request to provider for review/approval because:  Needs an annual visit.   Cathy Mckeon RN on 5/15/2023 at 3:22 PM         Fall with Harm Risk

## 2023-12-03 NOTE — ED ADULT TRIAGE NOTE - CHIEF COMPLAINT QUOTE
fever up to 102, AMS, baseline: A&Ox3, is a physician, s/p mechanical fall x 1 hr ago, no head inj/LOC

## 2023-12-03 NOTE — H&P ADULT - NSHPSOCIALHISTORY_GEN_ALL_CORE
Patient denies any tobacco or cigarette use. Patient denies any alcohol use or illicit substance use. Patient leaves with his wife and performs his ADLs independently

## 2023-12-03 NOTE — H&P ADULT - NSHPPHYSICALEXAM_GEN_ALL_CORE
General: NAD, well groomed, well developed   Head: atraumatic, normocephalic   Eyes: pupils equal, round, reactive to light, anicteric sclera   ENMT: slightly dry mucous membranes   Neck: supple, no JVD, no tender lymphadenopathy   Heart: regular rate and rhythm, normal s1,2, no murmurs, rubs, or gallops   Abdomen: soft, non-tender, non-distended, normoactive bowel sounds   Extremities: war, well perfused, 1+ lower extremity edema   Neuro: AOx3, no focal neurological deficits   Psych: appropriate affect

## 2023-12-03 NOTE — H&P ADULT - HISTORY OF PRESENT ILLNESS
83 year old male with PMH significant for HTN and BPH s/p TURP presenting to the ED following an unwitnessed fall. Patient reports that for the last 3 days he has been having a non-productive and was recently tested positive for Covid. Today, patient reports that he slipped on a plastic bag and fell. Patient does not remember if he hit is head. Patient's wife and daughter report that after the fall he was slightly confused. Patient is not on any anticoagulation. Patient reports having a temp to 101 earlier today, Patient's wife had Covid last week. At this time, patient denies any fever, chills, chest pain, shortness of breath, nausea, vomiting, abdominal pain, constipation, or diarrhea.     On arrival, Temp 101.5, , /110, RR 22, SpO2 100% on 6L NC. Labs notable for a WBC count of 13.3, Na 134, lactate 2.5->1.4. U/a negative, limited RVP + for Covid. Chest Xray showed left basilar airspace disease. CT head showed no acute intracranial hemorrhage or mass effect. CT neck showed no definite acute fracture or traumatic malalignment. However, please note that fracture of the dens cannot be excluded simply due to extensive motion artifact at this location. In ED, patient was given 2L of IV fluid and 1x dose of ceftriaxone and azithromycin. Blood cultures and urine cultures obtained.  83 year old male with PMH significant for HTN and BPH s/p TURP presenting to the ED following an unwitnessed fall. Patient reports that for the last 3 days he has been having a non-productive and was recently tested positive for Covid. Today, patient reports that he slipped on a plastic bag and fell. Patient does not remember if he hit is head. Patient's wife and daughter report that after the fall he was slightly confused. Patient is not on any anticoagulation. Patient reports having a temp to 101 earlier today, Patient's wife had Covid last week. At this time, patient denies any fever, chills, chest pain, shortness of breath, nausea, vomiting, abdominal pain, constipation, or diarrhea.     On arrival, Temp 101.5, , /110, RR 22, SpO2 100% on 6L NC. Labs notable for a WBC count of 13.3, Na 134, lactate 2.5->1.4. U/a negative, limited RVP + for Covid. Chest Xray showed left basilar airspace disease. CT head showed no acute intracranial hemorrhage or mass effect. CT cervical spine showed no definite acute fracture or traumatic malalignment. However, please note that fracture of the dens cannot be excluded simply due to extensive motion artifact at this location. In ED, patient was given 2L of IV fluid and 1x dose of ceftriaxone and azithromycin. Blood cultures and urine cultures obtained.

## 2023-12-03 NOTE — H&P ADULT - PROBLEM SELECTOR PLAN 2
- patient reported slipped on plastic bag but was feeling weak prior to mechanical fall 2/2 infectious process ]\  - no indication for syncopal workup at this time   - CT Head negative for infectious process   - Pt evaluation - patient reported slipped on plastic bag but was feeling weak prior to mechanical fall 2/2 infectious process ]\  - no indication for syncopal workup at this time   - CT Head negative for acute process   - CT cervical spine showed no definite acute fracture or traumatic malalignment  - Pt evaluation - patient reported slipped on plastic bag but was feeling weak prior to mechanical fall 2/2 infectious process  - no indication for syncopal workup at this time   - CT Head negative for acute process   - CT cervical spine showed no definite acute fracture or traumatic malalignment  - Pt evaluation

## 2023-12-03 NOTE — H&P ADULT - PROBLEM SELECTOR PLAN 3
- at home on toprolol 50mg BID, amlodpine 5mg daily with an additional 5 every other day   - will continue with home regimen at this time - at home on Toprol 50mg BID, amlodipine 5mg daily with an additional 5 every other day   - will continue with home regimen at this time

## 2023-12-03 NOTE — ED PROVIDER NOTE - PHYSICAL EXAMINATION
PHYSICAL EXAM:  GENERAL: NAD, lying in bed comfortably  HEAD:  Atraumatic, Normocephalic  EYES: EOMI, conjunctiva and sclera clear  ENT: Moist mucous membranes  NECK: Supple  CHEST/LUNG: Clear to auscultation bilaterally; No rales, rhonchi, wheezing, or rubs. Unlabored respirations  HEART: tachycardic, regular rhythm; No murmurs, rubs, or gallops  ABDOMEN: Bowel sounds present; Soft, Nontender, Nondistended. No hepatomegally  EXTREMITIES:  2+ Peripheral Pulses, brisk capillary refill. No clubbing, cyanosis, or edema  NERVOUS SYSTEM:  Alert & Oriented X1. No deficits   MSK: FROM all 4 extremities, full and equal strength  SKIN: No rashes or lesions

## 2023-12-03 NOTE — ED PROVIDER NOTE - OBJECTIVE STATEMENT
83M h/o HTN p/w fever, AMS that began today. Per daughter at bedside, pt is normally AAOx3, but was not acting like himself starting this AM. Also noted him to have fever to 101 today, possible SOB. [place] was unsteady and pt had unwitnessed fall when walking to the bathroom. Wife had COVID last week. Denies CP, abdominal pain, diarrhea, n/v, urinary sxs. No recent travel. 83M h/o HTN p/w fever, AMS that began today. Per daughter at bedside, pt is normally AAOx3, but was not acting like himself starting this AM. Also noted him to have fever to 101 today, possible SOB. [place] was unsteady and pt had unwitnessed fall when walking to the bathroom. Wife had COVID last week. Denies CP, abdominal pain, diarrhea, n/v, urinary sxs. No recent travel.

## 2023-12-03 NOTE — H&P ADULT - PROBLEM SELECTOR PLAN 1
chest xray showing left basilar airspace disease, c/f pneumonia  - will continue with ceftriaxone and azithromycin for community acquired PNA  - f/u urine legionella and strep pneumoniae   - symptomatic care for COVID, no indication for Remdesivir or decadron at this time  - f/u blood and urine cultures   - trend WBC and fever curve chest xray showing left basilar airspace disease, c/f pneumonia  - will continue with ceftriaxone and azithromycin for community acquired PNA  - f/u urine legionella and strep pneumoniae   - symptomatic care for COVID, no indication for Remdesivir or decadron at this time  - f/u blood and urine cultures   - f/u MRSA PCR  - trend WBC and fever curve chest xray showing left basilar airspace disease, c/f pneumonia  - will continue with ceftriaxone and azithromycin for community acquired PNA  - f/u urine legionella and strep pneumoniae   - symptomatic care for COVID, will start 3 day of Remdsivir   - f/u blood and urine cultures   - f/u MRSA PCR  - trend WBC and fever curve  - pro-BNP, trop, and procal in am

## 2023-12-03 NOTE — ED ADULT NURSE NOTE - OBJECTIVE STATEMENT
Patient is a 83y male presenting to the ED via EMS from home with c/o fever. Patient currently A&Ox1, alert to self, is typically A&Ox3. According to family, the patient "was not himself" when he woke up, was "slightly altered." This afternoon he had an unwitnessed fall, unknown LOC, unknown head strike, family states the patient's mental status became altered and they noticed he felt "very warm." Patient had 102 degree Fahrenheit oral temperature at home. Upon arrival to [facility] ED, patient is awake, restless and not staying still. Patient is tachypneic and tachycardic with heart rate ranging between 115-120 bpm. ECG performed at bedside, patient placed on cardiac monitor. Room air oxygen saturation was 92%, placed on 2L nasal cannula which improved oxygen saturation to 98%. Respirations spontaneous, even, and labored. Abdomen soft, distended and non-tender to palpation. Family reports the patient's wife was COVID+ 1 week ago. Denies chest pain, headache, N/V/D, abdominal pain, burning upon urination or difficulty urinating, hematuria. Patient is a 83y male presenting to the ED via EMS from home with c/o fever. Patient currently A&Ox1, alert to self, is typically A&Ox3. According to family, the patient "was not himself" when he woke up, was "slightly altered." This afternoon he had an unwitnessed fall, unknown LOC, unknown head strike, family states the patient's mental status became altered and they noticed he felt "very warm." Patient had 102 degree Fahrenheit oral temperature at home. Upon arrival to [facility] ED, patient is awake, restless and not staying still. Patient is tachypneic and tachycardic with heart rate ranging between 115-120 bpm. ECG performed at bedside, patient placed on cardiac monitor. Room air oxygen saturation was 92%, placed on 2L nasal cannula which improved oxygen saturation to 98%. Respirations spontaneous, even, and labored. Abdomen soft, distended and non-tender to palpation. Family reports the patient's wife was COVID+ 1 week ago. Denies chest pain, headache, N/V/D, abdominal pain, burning upon urination or difficulty urinating, hematuria.

## 2023-12-04 DIAGNOSIS — G93.41 METABOLIC ENCEPHALOPATHY: ICD-10-CM

## 2023-12-04 LAB
A1C WITH ESTIMATED AVERAGE GLUCOSE RESULT: 6.2 % — HIGH (ref 4–5.6)
A1C WITH ESTIMATED AVERAGE GLUCOSE RESULT: 6.2 % — HIGH (ref 4–5.6)
ALBUMIN SERPL ELPH-MCNC: 4.2 G/DL — SIGNIFICANT CHANGE UP (ref 3.3–5)
ALBUMIN SERPL ELPH-MCNC: 4.2 G/DL — SIGNIFICANT CHANGE UP (ref 3.3–5)
ALP SERPL-CCNC: 85 U/L — SIGNIFICANT CHANGE UP (ref 40–120)
ALP SERPL-CCNC: 85 U/L — SIGNIFICANT CHANGE UP (ref 40–120)
ALT FLD-CCNC: 21 U/L — SIGNIFICANT CHANGE UP (ref 10–45)
ALT FLD-CCNC: 21 U/L — SIGNIFICANT CHANGE UP (ref 10–45)
ANION GAP SERPL CALC-SCNC: 12 MMOL/L — SIGNIFICANT CHANGE UP (ref 5–17)
ANION GAP SERPL CALC-SCNC: 12 MMOL/L — SIGNIFICANT CHANGE UP (ref 5–17)
APTT BLD: 28.4 SEC — SIGNIFICANT CHANGE UP (ref 24.5–35.6)
APTT BLD: 28.4 SEC — SIGNIFICANT CHANGE UP (ref 24.5–35.6)
AST SERPL-CCNC: 25 U/L — SIGNIFICANT CHANGE UP (ref 10–40)
AST SERPL-CCNC: 25 U/L — SIGNIFICANT CHANGE UP (ref 10–40)
BASE EXCESS BLDV CALC-SCNC: 3.8 MMOL/L — HIGH (ref -2–3)
BASE EXCESS BLDV CALC-SCNC: 3.8 MMOL/L — HIGH (ref -2–3)
BASOPHILS # BLD AUTO: 0.09 K/UL — SIGNIFICANT CHANGE UP (ref 0–0.2)
BASOPHILS # BLD AUTO: 0.09 K/UL — SIGNIFICANT CHANGE UP (ref 0–0.2)
BASOPHILS NFR BLD AUTO: 0.9 % — SIGNIFICANT CHANGE UP (ref 0–2)
BASOPHILS NFR BLD AUTO: 0.9 % — SIGNIFICANT CHANGE UP (ref 0–2)
BILIRUB SERPL-MCNC: 0.6 MG/DL — SIGNIFICANT CHANGE UP (ref 0.2–1.2)
BILIRUB SERPL-MCNC: 0.6 MG/DL — SIGNIFICANT CHANGE UP (ref 0.2–1.2)
BUN SERPL-MCNC: 13 MG/DL — SIGNIFICANT CHANGE UP (ref 7–23)
BUN SERPL-MCNC: 13 MG/DL — SIGNIFICANT CHANGE UP (ref 7–23)
CA-I SERPL-SCNC: 1.16 MMOL/L — SIGNIFICANT CHANGE UP (ref 1.15–1.33)
CA-I SERPL-SCNC: 1.16 MMOL/L — SIGNIFICANT CHANGE UP (ref 1.15–1.33)
CALCIUM SERPL-MCNC: 9.5 MG/DL — SIGNIFICANT CHANGE UP (ref 8.4–10.5)
CALCIUM SERPL-MCNC: 9.5 MG/DL — SIGNIFICANT CHANGE UP (ref 8.4–10.5)
CHLORIDE BLDV-SCNC: 99 MMOL/L — SIGNIFICANT CHANGE UP (ref 96–108)
CHLORIDE BLDV-SCNC: 99 MMOL/L — SIGNIFICANT CHANGE UP (ref 96–108)
CHLORIDE SERPL-SCNC: 99 MMOL/L — SIGNIFICANT CHANGE UP (ref 96–108)
CHLORIDE SERPL-SCNC: 99 MMOL/L — SIGNIFICANT CHANGE UP (ref 96–108)
CHOLEST SERPL-MCNC: 177 MG/DL — SIGNIFICANT CHANGE UP
CHOLEST SERPL-MCNC: 177 MG/DL — SIGNIFICANT CHANGE UP
CO2 BLDV-SCNC: 31 MMOL/L — HIGH (ref 22–26)
CO2 BLDV-SCNC: 31 MMOL/L — HIGH (ref 22–26)
CO2 SERPL-SCNC: 28 MMOL/L — SIGNIFICANT CHANGE UP (ref 22–31)
CO2 SERPL-SCNC: 28 MMOL/L — SIGNIFICANT CHANGE UP (ref 22–31)
CREAT SERPL-MCNC: 0.83 MG/DL — SIGNIFICANT CHANGE UP (ref 0.5–1.3)
CREAT SERPL-MCNC: 0.83 MG/DL — SIGNIFICANT CHANGE UP (ref 0.5–1.3)
EGFR: 86 ML/MIN/1.73M2 — SIGNIFICANT CHANGE UP
EGFR: 86 ML/MIN/1.73M2 — SIGNIFICANT CHANGE UP
EOSINOPHIL # BLD AUTO: 0.01 K/UL — SIGNIFICANT CHANGE UP (ref 0–0.5)
EOSINOPHIL # BLD AUTO: 0.01 K/UL — SIGNIFICANT CHANGE UP (ref 0–0.5)
EOSINOPHIL NFR BLD AUTO: 0.1 % — SIGNIFICANT CHANGE UP (ref 0–6)
EOSINOPHIL NFR BLD AUTO: 0.1 % — SIGNIFICANT CHANGE UP (ref 0–6)
ESTIMATED AVERAGE GLUCOSE: 131 MG/DL — HIGH (ref 68–114)
ESTIMATED AVERAGE GLUCOSE: 131 MG/DL — HIGH (ref 68–114)
GAS PNL BLDV: 131 MMOL/L — LOW (ref 136–145)
GAS PNL BLDV: 131 MMOL/L — LOW (ref 136–145)
GAS PNL BLDV: SIGNIFICANT CHANGE UP
GLUCOSE BLDV-MCNC: 93 MG/DL — SIGNIFICANT CHANGE UP (ref 70–99)
GLUCOSE BLDV-MCNC: 93 MG/DL — SIGNIFICANT CHANGE UP (ref 70–99)
GLUCOSE SERPL-MCNC: 106 MG/DL — HIGH (ref 70–99)
GLUCOSE SERPL-MCNC: 106 MG/DL — HIGH (ref 70–99)
HCO3 BLDV-SCNC: 30 MMOL/L — HIGH (ref 22–29)
HCO3 BLDV-SCNC: 30 MMOL/L — HIGH (ref 22–29)
HCT VFR BLD CALC: 47.7 % — SIGNIFICANT CHANGE UP (ref 39–50)
HCT VFR BLD CALC: 47.7 % — SIGNIFICANT CHANGE UP (ref 39–50)
HCT VFR BLDA CALC: 48 % — SIGNIFICANT CHANGE UP (ref 39–51)
HCT VFR BLDA CALC: 48 % — SIGNIFICANT CHANGE UP (ref 39–51)
HDLC SERPL-MCNC: 47 MG/DL — SIGNIFICANT CHANGE UP
HDLC SERPL-MCNC: 47 MG/DL — SIGNIFICANT CHANGE UP
HGB BLD CALC-MCNC: 16.1 G/DL — SIGNIFICANT CHANGE UP (ref 12.6–17.4)
HGB BLD CALC-MCNC: 16.1 G/DL — SIGNIFICANT CHANGE UP (ref 12.6–17.4)
HGB BLD-MCNC: 15.2 G/DL — SIGNIFICANT CHANGE UP (ref 13–17)
HGB BLD-MCNC: 15.2 G/DL — SIGNIFICANT CHANGE UP (ref 13–17)
IMM GRANULOCYTES NFR BLD AUTO: 0.7 % — SIGNIFICANT CHANGE UP (ref 0–0.9)
IMM GRANULOCYTES NFR BLD AUTO: 0.7 % — SIGNIFICANT CHANGE UP (ref 0–0.9)
INR BLD: 1.17 RATIO — SIGNIFICANT CHANGE UP (ref 0.85–1.18)
INR BLD: 1.17 RATIO — SIGNIFICANT CHANGE UP (ref 0.85–1.18)
LACTATE BLDV-MCNC: 1.7 MMOL/L — SIGNIFICANT CHANGE UP (ref 0.5–2)
LACTATE BLDV-MCNC: 1.7 MMOL/L — SIGNIFICANT CHANGE UP (ref 0.5–2)
LACTATE BLDV-MCNC: 5.1 MMOL/L — CRITICAL HIGH (ref 0.5–2)
LACTATE BLDV-MCNC: 5.1 MMOL/L — CRITICAL HIGH (ref 0.5–2)
LEGIONELLA AG UR QL: NEGATIVE — SIGNIFICANT CHANGE UP
LEGIONELLA AG UR QL: NEGATIVE — SIGNIFICANT CHANGE UP
LIPID PNL WITH DIRECT LDL SERPL: 116 MG/DL — HIGH
LIPID PNL WITH DIRECT LDL SERPL: 116 MG/DL — HIGH
LYMPHOCYTES # BLD AUTO: 1.31 K/UL — SIGNIFICANT CHANGE UP (ref 1–3.3)
LYMPHOCYTES # BLD AUTO: 1.31 K/UL — SIGNIFICANT CHANGE UP (ref 1–3.3)
LYMPHOCYTES # BLD AUTO: 13 % — SIGNIFICANT CHANGE UP (ref 13–44)
LYMPHOCYTES # BLD AUTO: 13 % — SIGNIFICANT CHANGE UP (ref 13–44)
MAGNESIUM SERPL-MCNC: 2.1 MG/DL — SIGNIFICANT CHANGE UP (ref 1.6–2.6)
MAGNESIUM SERPL-MCNC: 2.1 MG/DL — SIGNIFICANT CHANGE UP (ref 1.6–2.6)
MCHC RBC-ENTMCNC: 29.2 PG — SIGNIFICANT CHANGE UP (ref 27–34)
MCHC RBC-ENTMCNC: 29.2 PG — SIGNIFICANT CHANGE UP (ref 27–34)
MCHC RBC-ENTMCNC: 31.9 GM/DL — LOW (ref 32–36)
MCHC RBC-ENTMCNC: 31.9 GM/DL — LOW (ref 32–36)
MCV RBC AUTO: 91.6 FL — SIGNIFICANT CHANGE UP (ref 80–100)
MCV RBC AUTO: 91.6 FL — SIGNIFICANT CHANGE UP (ref 80–100)
MONOCYTES # BLD AUTO: 1.28 K/UL — HIGH (ref 0–0.9)
MONOCYTES # BLD AUTO: 1.28 K/UL — HIGH (ref 0–0.9)
MONOCYTES NFR BLD AUTO: 12.7 % — SIGNIFICANT CHANGE UP (ref 2–14)
MONOCYTES NFR BLD AUTO: 12.7 % — SIGNIFICANT CHANGE UP (ref 2–14)
NEUTROPHILS # BLD AUTO: 7.32 K/UL — SIGNIFICANT CHANGE UP (ref 1.8–7.4)
NEUTROPHILS # BLD AUTO: 7.32 K/UL — SIGNIFICANT CHANGE UP (ref 1.8–7.4)
NEUTROPHILS NFR BLD AUTO: 72.6 % — SIGNIFICANT CHANGE UP (ref 43–77)
NEUTROPHILS NFR BLD AUTO: 72.6 % — SIGNIFICANT CHANGE UP (ref 43–77)
NON HDL CHOLESTEROL: 130 MG/DL — HIGH
NON HDL CHOLESTEROL: 130 MG/DL — HIGH
NRBC # BLD: 0 /100 WBCS — SIGNIFICANT CHANGE UP (ref 0–0)
NRBC # BLD: 0 /100 WBCS — SIGNIFICANT CHANGE UP (ref 0–0)
NT-PROBNP SERPL-SCNC: 1896 PG/ML — HIGH (ref 0–300)
NT-PROBNP SERPL-SCNC: 1896 PG/ML — HIGH (ref 0–300)
PCO2 BLDV: 48 MMHG — SIGNIFICANT CHANGE UP (ref 42–55)
PCO2 BLDV: 48 MMHG — SIGNIFICANT CHANGE UP (ref 42–55)
PH BLDV: 7.4 — SIGNIFICANT CHANGE UP (ref 7.32–7.43)
PH BLDV: 7.4 — SIGNIFICANT CHANGE UP (ref 7.32–7.43)
PHOSPHATE SERPL-MCNC: 2.6 MG/DL — SIGNIFICANT CHANGE UP (ref 2.5–4.5)
PHOSPHATE SERPL-MCNC: 2.6 MG/DL — SIGNIFICANT CHANGE UP (ref 2.5–4.5)
PLATELET # BLD AUTO: 253 K/UL — SIGNIFICANT CHANGE UP (ref 150–400)
PLATELET # BLD AUTO: 253 K/UL — SIGNIFICANT CHANGE UP (ref 150–400)
PO2 BLDV: 49 MMHG — HIGH (ref 25–45)
PO2 BLDV: 49 MMHG — HIGH (ref 25–45)
POTASSIUM BLDV-SCNC: 3.3 MMOL/L — LOW (ref 3.5–5.1)
POTASSIUM BLDV-SCNC: 3.3 MMOL/L — LOW (ref 3.5–5.1)
POTASSIUM SERPL-MCNC: 3.1 MMOL/L — LOW (ref 3.5–5.3)
POTASSIUM SERPL-MCNC: 3.1 MMOL/L — LOW (ref 3.5–5.3)
POTASSIUM SERPL-SCNC: 3.1 MMOL/L — LOW (ref 3.5–5.3)
POTASSIUM SERPL-SCNC: 3.1 MMOL/L — LOW (ref 3.5–5.3)
PROCALCITONIN SERPL-MCNC: 0.1 NG/ML — SIGNIFICANT CHANGE UP (ref 0.02–0.1)
PROCALCITONIN SERPL-MCNC: 0.1 NG/ML — SIGNIFICANT CHANGE UP (ref 0.02–0.1)
PROT SERPL-MCNC: 7.3 G/DL — SIGNIFICANT CHANGE UP (ref 6–8.3)
PROT SERPL-MCNC: 7.3 G/DL — SIGNIFICANT CHANGE UP (ref 6–8.3)
PROTHROM AB SERPL-ACNC: 12.2 SEC — SIGNIFICANT CHANGE UP (ref 9.5–13)
PROTHROM AB SERPL-ACNC: 12.2 SEC — SIGNIFICANT CHANGE UP (ref 9.5–13)
RBC # BLD: 5.21 M/UL — SIGNIFICANT CHANGE UP (ref 4.2–5.8)
RBC # BLD: 5.21 M/UL — SIGNIFICANT CHANGE UP (ref 4.2–5.8)
RBC # FLD: 14.3 % — SIGNIFICANT CHANGE UP (ref 10.3–14.5)
RBC # FLD: 14.3 % — SIGNIFICANT CHANGE UP (ref 10.3–14.5)
S PNEUM AG UR QL: NEGATIVE — SIGNIFICANT CHANGE UP
S PNEUM AG UR QL: NEGATIVE — SIGNIFICANT CHANGE UP
SAO2 % BLDV: 78.2 % — SIGNIFICANT CHANGE UP (ref 67–88)
SAO2 % BLDV: 78.2 % — SIGNIFICANT CHANGE UP (ref 67–88)
SODIUM SERPL-SCNC: 139 MMOL/L — SIGNIFICANT CHANGE UP (ref 135–145)
SODIUM SERPL-SCNC: 139 MMOL/L — SIGNIFICANT CHANGE UP (ref 135–145)
TRIGL SERPL-MCNC: 76 MG/DL — SIGNIFICANT CHANGE UP
TRIGL SERPL-MCNC: 76 MG/DL — SIGNIFICANT CHANGE UP
TROPONIN T, HIGH SENSITIVITY RESULT: 44 NG/L — SIGNIFICANT CHANGE UP (ref 0–51)
TROPONIN T, HIGH SENSITIVITY RESULT: 44 NG/L — SIGNIFICANT CHANGE UP (ref 0–51)
WBC # BLD: 10.08 K/UL — SIGNIFICANT CHANGE UP (ref 3.8–10.5)
WBC # BLD: 10.08 K/UL — SIGNIFICANT CHANGE UP (ref 3.8–10.5)
WBC # FLD AUTO: 10.08 K/UL — SIGNIFICANT CHANGE UP (ref 3.8–10.5)
WBC # FLD AUTO: 10.08 K/UL — SIGNIFICANT CHANGE UP (ref 3.8–10.5)

## 2023-12-04 PROCEDURE — 99233 SBSQ HOSP IP/OBS HIGH 50: CPT | Mod: GC

## 2023-12-04 RX ORDER — POTASSIUM CHLORIDE 20 MEQ
10 PACKET (EA) ORAL
Refills: 0 | Status: COMPLETED | OUTPATIENT
Start: 2023-12-04 | End: 2023-12-04

## 2023-12-04 RX ORDER — REMDESIVIR 5 MG/ML
INJECTION INTRAVENOUS
Refills: 0 | Status: COMPLETED | OUTPATIENT
Start: 2023-12-04 | End: 2023-12-08

## 2023-12-04 RX ORDER — POTASSIUM CHLORIDE 20 MEQ
40 PACKET (EA) ORAL ONCE
Refills: 0 | Status: COMPLETED | OUTPATIENT
Start: 2023-12-04 | End: 2023-12-04

## 2023-12-04 RX ORDER — OLANZAPINE 15 MG/1
2.5 TABLET, FILM COATED ORAL ONCE
Refills: 0 | Status: COMPLETED | OUTPATIENT
Start: 2023-12-04 | End: 2023-12-04

## 2023-12-04 RX ORDER — REMDESIVIR 5 MG/ML
200 INJECTION INTRAVENOUS EVERY 24 HOURS
Refills: 0 | Status: COMPLETED | OUTPATIENT
Start: 2023-12-04 | End: 2023-12-04

## 2023-12-04 RX ORDER — AMLODIPINE BESYLATE 2.5 MG/1
5 TABLET ORAL DAILY
Refills: 0 | Status: DISCONTINUED | OUTPATIENT
Start: 2023-12-04 | End: 2023-12-07

## 2023-12-04 RX ORDER — ENOXAPARIN SODIUM 100 MG/ML
40 INJECTION SUBCUTANEOUS EVERY 24 HOURS
Refills: 0 | Status: DISCONTINUED | OUTPATIENT
Start: 2023-12-04 | End: 2023-12-08

## 2023-12-04 RX ORDER — CHLORHEXIDINE GLUCONATE 213 G/1000ML
1 SOLUTION TOPICAL DAILY
Refills: 0 | Status: DISCONTINUED | OUTPATIENT
Start: 2023-12-04 | End: 2023-12-05

## 2023-12-04 RX ORDER — METOPROLOL TARTRATE 50 MG
50 TABLET ORAL
Refills: 0 | Status: DISCONTINUED | OUTPATIENT
Start: 2023-12-04 | End: 2023-12-08

## 2023-12-04 RX ORDER — SODIUM CHLORIDE 9 MG/ML
1000 INJECTION, SOLUTION INTRAVENOUS ONCE
Refills: 0 | Status: COMPLETED | OUTPATIENT
Start: 2023-12-04 | End: 2023-12-04

## 2023-12-04 RX ORDER — REMDESIVIR 5 MG/ML
100 INJECTION INTRAVENOUS EVERY 24 HOURS
Refills: 0 | Status: COMPLETED | OUTPATIENT
Start: 2023-12-05 | End: 2023-12-08

## 2023-12-04 RX ORDER — INFLUENZA VIRUS VACCINE 15; 15; 15; 15 UG/.5ML; UG/.5ML; UG/.5ML; UG/.5ML
0.7 SUSPENSION INTRAMUSCULAR ONCE
Refills: 0 | Status: COMPLETED | OUTPATIENT
Start: 2023-12-04 | End: 2023-12-08

## 2023-12-04 RX ORDER — AZITHROMYCIN 500 MG/1
500 TABLET, FILM COATED ORAL EVERY 24 HOURS
Refills: 0 | Status: DISCONTINUED | OUTPATIENT
Start: 2023-12-04 | End: 2023-12-04

## 2023-12-04 RX ORDER — DEXAMETHASONE 0.5 MG/5ML
6 ELIXIR ORAL DAILY
Refills: 0 | Status: DISCONTINUED | OUTPATIENT
Start: 2023-12-04 | End: 2023-12-07

## 2023-12-04 RX ADMIN — Medication 650 MILLIGRAM(S): at 18:47

## 2023-12-04 RX ADMIN — AMLODIPINE BESYLATE 5 MILLIGRAM(S): 2.5 TABLET ORAL at 05:49

## 2023-12-04 RX ADMIN — ENOXAPARIN SODIUM 40 MILLIGRAM(S): 100 INJECTION SUBCUTANEOUS at 05:49

## 2023-12-04 RX ADMIN — Medication 650 MILLIGRAM(S): at 17:47

## 2023-12-04 RX ADMIN — CEFTRIAXONE 100 MILLIGRAM(S): 500 INJECTION, POWDER, FOR SOLUTION INTRAMUSCULAR; INTRAVENOUS at 05:48

## 2023-12-04 RX ADMIN — Medication 50 MILLIGRAM(S): at 17:48

## 2023-12-04 RX ADMIN — Medication 100 MILLIEQUIVALENT(S): at 09:19

## 2023-12-04 RX ADMIN — Medication 50 MILLIGRAM(S): at 05:50

## 2023-12-04 RX ADMIN — OLANZAPINE 2.5 MILLIGRAM(S): 15 TABLET, FILM COATED ORAL at 06:16

## 2023-12-04 RX ADMIN — REMDESIVIR 200 MILLIGRAM(S): 5 INJECTION INTRAVENOUS at 11:08

## 2023-12-04 RX ADMIN — Medication 650 MILLIGRAM(S): at 01:17

## 2023-12-04 RX ADMIN — Medication 100 MILLIEQUIVALENT(S): at 12:08

## 2023-12-04 RX ADMIN — Medication 100 MILLIEQUIVALENT(S): at 11:09

## 2023-12-04 RX ADMIN — OLANZAPINE 2.5 MILLIGRAM(S): 15 TABLET, FILM COATED ORAL at 07:31

## 2023-12-04 RX ADMIN — SODIUM CHLORIDE 1000 MILLILITER(S): 9 INJECTION, SOLUTION INTRAVENOUS at 08:35

## 2023-12-04 NOTE — PROGRESS NOTE ADULT - ASSESSMENT
83 year old male with PMH significant for HTN and BPH s/p TURP who is being admitted after a mechanical fall likely due to weakness 2/2 to Covid PNA sepsis  83 year old male with PMH significant for HTN and BPH s/p TURP who is being admitted after a mechanical fall likely due to weakness 2/2 to Covid PNA sepsis.

## 2023-12-04 NOTE — PROGRESS NOTE ADULT - ATTENDING COMMENTS
83M w/ PMHx significant for HTN and BPH s/p TURP p/w fall found to have sepsis and acute hypoxemic respiratory failure likely 2/2 to COVID PNA and acute metabolic encephlopathu    feels OK, received zyprexa this morning- daughter  Steff at bedside    # Acute hypoxemic respiratory failure 2/2 to COVID 19 PNA  # Sepsis   # Acute metabolic encephalopathy  # Hypertension      -F/u BCxs,  -Cont. BP meds with hold parameters for now  -c/w IV Remdisivir/ dex  -Isolation precautions  -Supportive care  -Procalcitonin neg can dc IV abx,   -DVT PPx, Lovenox 83M w/ PMHx significant for HTN and BPH s/p TURP p/w fall found to have sepsis and acute hypoxemic respiratory failure likely 2/2 to COVID PNA and acute metabolic encephlopathu    feels OK, received zyprexa this morning- daughter  Steff at bedside    # Acute hypoxemic respiratory failure 2/2 to COVID 19 PNA  # Sepsis   # Acute metabolic encephalopathy  # Hypertension    - CTH no acute pathology  -frequent reorientation, avoid antipsychotics, mittens prn [place] 2/2 to infection   -F/u BCxs,  -Cont. BP meds with hold parameters for now  -c/w IV Remdisivir/ dex  -Isolation precautions  -Supportive care  -Procalcitonin neg can dc IV abx,   -DVT PPx, Lovenox

## 2023-12-04 NOTE — PHYSICAL THERAPY INITIAL EVALUATION ADULT - PERTINENT HX OF CURRENT PROBLEM, REHAB EVAL
83 y.o. M PMH HTN and BPH s/p TURP presenting to the ED following an unwitnessed fall. Patient reports that for the last 3 days he has been having a non-productive and was recently tested positive for Covid. Today, patient reports that he slipped on a plastic bag and fell. Patient does not remember if he hit is head. Patient's wife and daughter report that after the fall he was slightly confused. Patient is not on any anticoagulation. Patient reports having a temp to 101 earlier today, Patient's wife had Covid last week. At this time, patient denies any fever, chills, chest pain, shortness of breath, nausea, vomiting, abdominal pain, constipation, or diarrhea.     On arrival, Temp 101.5, , /110, RR 22, SpO2 100% on 6L NC. Labs notable for a WBC count of 13.3, Na 134, lactate 2.5->1.4. U/a negative, limited RVP + for Covid. Chest Xray showed left basilar airspace disease. CT head showed no acute intracranial hemorrhage or mass effect. CT cervical spine showed no definite acute fracture or traumatic malalignment. However, please note that fracture of the dens cannot be excluded simply due to extensive motion artifact at this location. In ED, patient was given 2L of IV fluid and 1x dose of ceftriaxone and azithromycin. Blood cultures and urine cultures obtained.

## 2023-12-04 NOTE — PHYSICAL THERAPY INITIAL EVALUATION ADULT - NSPTDISCHREC_GEN_A_CORE
If patient d/c'd home pt will require assist with all functional mobility and ADLs, Home PT. Pt owns DME/[facility]

## 2023-12-04 NOTE — PROGRESS NOTE ADULT - PROBLEM SELECTOR PLAN 3
- at home on Toprol 50mg BID, amlodipine 5mg daily with an additional 5 every other day   - will continue with home regimen at this time - patient reported slipped on plastic bag but was feeling weak prior to mechanical fall 2/2 infectious process  - no indication for syncopal workup at this time   - CT Head negative for acute process   - CT cervical spine showed no definite acute fracture or traumatic malalignment  - Pt evaluation - patient reported slipped on plastic bag but was feeling weak prior to mechanical fall 2/2 infectious process  - no indication for syncopal workup at this time   - CT Head negative for acute process   - CT cervical spine showed no definite acute fracture or traumatic malalignment  - PT consulted.

## 2023-12-04 NOTE — PHYSICAL THERAPY INITIAL EVALUATION ADULT - ADDITIONAL COMMENTS
Per Care Coordination note: Pt lives in a private house with his spouse with 1 flight of steps to enter. Pt's spouse states pt was independent in ADLs and ambulationprior to hospitalization. Pt has a w/c, cane, RW, transfer bench and raisedtoilet seat at home, however as per pt's spouse pt currently only uses the w/c for long distance and does not use any of the other DMEs.

## 2023-12-04 NOTE — PROGRESS NOTE ADULT - PROBLEM SELECTOR PLAN 4
DVT ppx: lovenox   Diet: DASH/TLC   Dispo: medically active - at home on Toprol 50mg BID, amlodipine 5mg daily with an additional 5 every other day   - will continue with home regimen at this time

## 2023-12-04 NOTE — PROGRESS NOTE ADULT - PROBLEM SELECTOR PLAN 2
- patient reported slipped on plastic bag but was feeling weak prior to mechanical fall 2/2 infectious process  - no indication for syncopal workup at this time   - CT Head negative for acute process   - CT cervical spine showed no definite acute fracture or traumatic malalignment  - Pt evaluation chest xray showing left basilar airspace disease, c/f pneumonia  - will continue with ceftriaxone and azithromycin for community acquired PNA  - f/u urine legionella and strep pneumoniae   - symptomatic care for COVID, will start 3 day of Remdsivir   - f/u blood and urine cultures   - f/u MRSA PCR  - trend WBC and fever curve  - pro-BNP, trop, and procal in am chest xray showing left basilar airspace disease, c/f pneumonia  - DC ceftriaxone and azithromycin for community acquired PNA  - urine legionella and strep pneumoniae neg  - symptomatic care for COVID  - C/w remdesivir 5 day course  - f/u blood and urine cultures   - f/u MRSA PCR  - trend WBC and fever curve  - pro-BNP, trop, and procal WNL

## 2023-12-04 NOTE — PROGRESS NOTE ADULT - PROBLEM SELECTOR PLAN 1
chest xray showing left basilar airspace disease, c/f pneumonia  - will continue with ceftriaxone and azithromycin for community acquired PNA  - f/u urine legionella and strep pneumoniae   - symptomatic care for COVID, will start 3 day of Remdsivir   - f/u blood and urine cultures   - f/u MRSA PCR  - trend WBC and fever curve  - pro-BNP, trop, and procal in am - AMS iso COVID   - Most like 2/2 COVID  - Day 2/5 Remdesivir 12/4  - [facility] w/o significant findings  - Agitated O/N and given zyprexa 5 mg - AMS iso COVID   - Most like 2/2 COVID  - Day 2/5 Remdesivir 12/4  - [facility] w/o significant findings  - Agitated O/N and given zyprexa 5 mg

## 2023-12-04 NOTE — PATIENT PROFILE ADULT - FALL HARM RISK - HARM RISK INTERVENTIONS
Assistance with ambulation/Assistance OOB with selected safe patient handling equipment/Communicate Risk of Fall with Harm to all staff/Discuss with provider need for PT consult/Monitor gait and stability/Provide patient with walking aids - walker, cane, crutches/Reinforce activity limits and safety measures with patient and family/Tailored Fall Risk Interventions/Use of alarms - bed, chair and/or voice tab/Visual Cue: Yellow wristband and red socks/Bed in lowest position, wheels locked, appropriate side rails in place/Call bell, personal items and telephone in reach/Instruct patient to call for assistance before getting out of bed or chair/Non-slip footwear when patient is out of bed/[place] to call system/Physically safe environment - no spills, clutter or unnecessary equipment/Purposeful Proactive Rounding/Room/bathroom lighting operational, light cord in reach Assistance with ambulation/Assistance OOB with selected safe patient handling equipment/Communicate Risk of Fall with Harm to all staff/Discuss with provider need for PT consult/Monitor gait and stability/Provide patient with walking aids - walker, cane, crutches/Reinforce activity limits and safety measures with patient and family/Tailored Fall Risk Interventions/Use of alarms - bed, chair and/or voice tab/Visual Cue: Yellow wristband and red socks/Bed in lowest position, wheels locked, appropriate side rails in place/Call bell, personal items and telephone in reach/Instruct patient to call for assistance before getting out of bed or chair/Non-slip footwear when patient is out of bed/[place] to call system/Physically safe environment - no spills, clutter or unnecessary equipment/Purposeful Proactive Rounding/Room/bathroom lighting operational, light cord in reach

## 2023-12-04 NOTE — PROGRESS NOTE ADULT - SUBJECTIVE AND OBJECTIVE BOX
Internal Medicine   Loraine An | PGY-2    OVERNIGHT EVENTS: No acute overnight events.    SUBJECTIVE:       MEDICATIONS  (STANDING):  amLODIPine   Tablet 5 milliGRAM(s) Oral daily  azithromycin  IVPB 500 milliGRAM(s) IV Intermittent every 24 hours  cefTRIAXone   IVPB 1000 milliGRAM(s) IV Intermittent every 24 hours  chlorhexidine 2% Cloths 1 Application(s) Topical daily  enoxaparin Injectable 40 milliGRAM(s) SubCutaneous every 24 hours  metoprolol succinate ER 50 milliGRAM(s) Oral two times a day  remdesivir  IVPB   IV Intermittent     MEDICATIONS  (PRN):  acetaminophen     Tablet .. 650 milliGRAM(s) Oral every 6 hours PRN Temp greater or equal to 38C (100.4F), Mild Pain (1 - 3)  aluminum hydroxide/magnesium hydroxide/simethicone Suspension 30 milliLiter(s) Oral every 4 hours PRN Dyspepsia  guaifenesin/dextromethorphan Oral Liquid 10 milliLiter(s) Oral every 4 hours PRN Cough  melatonin 3 milliGRAM(s) Oral at bedtime PRN Insomnia  ondansetron Injectable 4 milliGRAM(s) IV Push every 8 hours PRN Nausea and/or Vomiting        T(F): 98.5 (12-04-23 @ 06:03), Max: 101.5 (12-03-23 @ 17:43)  HR: 80 (12-04-23 @ 04:30) (76 - 120)  BP: 165/75 (12-04-23 @ 04:30) (136/73 - 213/110)  BP(mean): --  RR: 19 (12-04-23 @ 04:30) (19 - 22)  SpO2: 94% (12-04-23 @ 04:30) (94% - 100%)    PHYSICAL EXAM:     GENERAL: NAD, lying in bed comfortably  HEAD:  Atraumatic, Normocephalic  EYES: EOMI, PERRLA, conjunctiva and sclera clear, no nystagmus noted  ENT: Moist mucous membranes,   NECK: Supple, No JVD, trachea midline  CHEST/LUNG: Clear to auscultation bilaterally; No rales, rhonchi, wheezing, or rubs. Unlabored respirations  HEART: Regular rate and rhythm; No murmurs, rubs, or gallops, normal S1/S2  ABDOMEN: normal bowel sounds; Soft, nontender, nondistended, no organomegaly   EXTREMITIES:  2+ Peripheral Pulses, brisk capillary refill. No clubbing, cyanosis, or edema  MSK: No gross deformities noted   Neurological:  A&Ox3, no focal deficits   SKIN: No rashes or lesions  PSYCH: Normal mood, affect     TELEMETRY:    LABS:                        15.2   13.30 )-----------( 280      ( 03 Dec 2023 18:48 )             45.0     12-03    134<L>  |  97  |  18  ----------------------------<  156<H>  4.6   |  23  |  0.80    Ca    9.6      03 Dec 2023 18:48    TPro  7.7  /  Alb  4.4  /  TBili  0.7  /  DBili  x   /  AST  48<H>  /  ALT  20  /  AlkPhos  85  12-03        PT/INR - ( 03 Dec 2023 18:48 )   PT: 12.3 sec;   INR: 1.12 ratio         PTT - ( 03 Dec 2023 18:48 )  PTT:28.7 sec  Blood Gas Profile w/Lytes - Venous: Performed In Lab (12-04-23 @ 06:56)  Blood Gas Profile w/Lytes - Venous: Performed In Lab (12-03-23 @ 21:43)  Blood Gas Profile w/Lytes - Venous: Performed In Lab (12-03-23 @ 18:15)    Creatinine Trend: 0.80<--  I&O's Summary    BNP  Blood Gas Profile w/Lytes - Venous: Performed In Lab (12-04-23 @ 06:56)  Blood Gas Profile w/Lytes - Venous: Performed In Lab (12-03-23 @ 21:43)  Blood Gas Profile w/Lytes - Venous: Performed In Lab (12-03-23 @ 18:15)    RADIOLOGY & ADDITIONAL STUDIES:             Internal Medicine   Loraine Nataliya | PGY-2    OVERNIGHT EVENTS: No acute overnight events.    SUBJECTIVE: Patient was seen and examined at bedside this morning. ROS limited iso pt's AMS. Pt AOx0 following commands. Wife updated at bedside. Vital signs/imaging/telemetry events reviewed.       MEDICATIONS  (STANDING):  amLODIPine   Tablet 5 milliGRAM(s) Oral daily  azithromycin  IVPB 500 milliGRAM(s) IV Intermittent every 24 hours  cefTRIAXone   IVPB 1000 milliGRAM(s) IV Intermittent every 24 hours  chlorhexidine 2% Cloths 1 Application(s) Topical daily  enoxaparin Injectable 40 milliGRAM(s) SubCutaneous every 24 hours  metoprolol succinate ER 50 milliGRAM(s) Oral two times a day  remdesivir  IVPB   IV Intermittent     MEDICATIONS  (PRN):  acetaminophen     Tablet .. 650 milliGRAM(s) Oral every 6 hours PRN Temp greater or equal to 38C (100.4F), Mild Pain (1 - 3)  aluminum hydroxide/magnesium hydroxide/simethicone Suspension 30 milliLiter(s) Oral every 4 hours PRN Dyspepsia  guaifenesin/dextromethorphan Oral Liquid 10 milliLiter(s) Oral every 4 hours PRN Cough  melatonin 3 milliGRAM(s) Oral at bedtime PRN Insomnia  ondansetron Injectable 4 milliGRAM(s) IV Push every 8 hours PRN Nausea and/or Vomiting        T(F): 98.5 (12-04-23 @ 06:03), Max: 101.5 (12-03-23 @ 17:43)  HR: 80 (12-04-23 @ 04:30) (76 - 120)  BP: 165/75 (12-04-23 @ 04:30) (136/73 - 213/110)  BP(mean): --  RR: 19 (12-04-23 @ 04:30) (19 - 22)  SpO2: 94% (12-04-23 @ 04:30) (94% - 100%)    PHYSICAL EXAM:     GENERAL: NAD, lying in bed comfortably  HEAD:  Atraumatic, Normocephalic  EYES: EOMI, PERRLA, conjunctiva and sclera clear, no nystagmus noted  ENT: Moist mucous membranes,   NECK: Supple, No JVD, trachea midline  CHEST/LUNG: Clear to auscultation bilaterally; No rales, rhonchi, wheezing, or rubs. Unlabored respirations  HEART: Regular rate and rhythm; No murmurs, rubs, or gallops, normal S1/S2  ABDOMEN: normal bowel sounds; Soft, nontender, nondistended, no organomegaly   EXTREMITIES:  2+ Peripheral Pulses, brisk capillary refill. No clubbing, cyanosis, or edema  MSK: No gross deformities noted   Neurological:  A&Ox0 but follows commands, no focal deficits   SKIN: No rashes or lesions  PSYCH: Normal mood, affect     TELEMETRY:    LABS:                        15.2   13.30 )-----------( 280      ( 03 Dec 2023 18:48 )             45.0     12-03    134<L>  |  97  |  18  ----------------------------<  156<H>  4.6   |  23  |  0.80    Ca    9.6      03 Dec 2023 18:48    TPro  7.7  /  Alb  4.4  /  TBili  0.7  /  DBili  x   /  AST  48<H>  /  ALT  20  /  AlkPhos  85  12-03        PT/INR - ( 03 Dec 2023 18:48 )   PT: 12.3 sec;   INR: 1.12 ratio         PTT - ( 03 Dec 2023 18:48 )  PTT:28.7 sec  Blood Gas Profile w/Lytes - Venous: Performed In Lab (12-04-23 @ 06:56)  Blood Gas Profile w/Lytes - Venous: Performed In Lab (12-03-23 @ 21:43)  Blood Gas Profile w/Lytes - Venous: Performed In Lab (12-03-23 @ 18:15)    Creatinine Trend: 0.80<--  I&O's Summary    BNP  Blood Gas Profile w/Lytes - Venous: Performed In Lab (12-04-23 @ 06:56)  Blood Gas Profile w/Lytes - Venous: Performed In Lab (12-03-23 @ 21:43)  Blood Gas Profile w/Lytes - Venous: Performed In Lab (12-03-23 @ 18:15)    RADIOLOGY & ADDITIONAL STUDIES:

## 2023-12-05 LAB
ANION GAP SERPL CALC-SCNC: 11 MMOL/L — SIGNIFICANT CHANGE UP (ref 5–17)
ANION GAP SERPL CALC-SCNC: 11 MMOL/L — SIGNIFICANT CHANGE UP (ref 5–17)
BUN SERPL-MCNC: 20 MG/DL — SIGNIFICANT CHANGE UP (ref 7–23)
BUN SERPL-MCNC: 20 MG/DL — SIGNIFICANT CHANGE UP (ref 7–23)
CALCIUM SERPL-MCNC: 9 MG/DL — SIGNIFICANT CHANGE UP (ref 8.4–10.5)
CALCIUM SERPL-MCNC: 9 MG/DL — SIGNIFICANT CHANGE UP (ref 8.4–10.5)
CHLORIDE SERPL-SCNC: 100 MMOL/L — SIGNIFICANT CHANGE UP (ref 96–108)
CHLORIDE SERPL-SCNC: 100 MMOL/L — SIGNIFICANT CHANGE UP (ref 96–108)
CO2 SERPL-SCNC: 29 MMOL/L — SIGNIFICANT CHANGE UP (ref 22–31)
CO2 SERPL-SCNC: 29 MMOL/L — SIGNIFICANT CHANGE UP (ref 22–31)
CREAT SERPL-MCNC: 1.07 MG/DL — SIGNIFICANT CHANGE UP (ref 0.5–1.3)
CREAT SERPL-MCNC: 1.07 MG/DL — SIGNIFICANT CHANGE UP (ref 0.5–1.3)
CULTURE RESULTS: SIGNIFICANT CHANGE UP
CULTURE RESULTS: SIGNIFICANT CHANGE UP
EGFR: 68 ML/MIN/1.73M2 — SIGNIFICANT CHANGE UP
EGFR: 68 ML/MIN/1.73M2 — SIGNIFICANT CHANGE UP
GLUCOSE SERPL-MCNC: 115 MG/DL — HIGH (ref 70–99)
GLUCOSE SERPL-MCNC: 115 MG/DL — HIGH (ref 70–99)
GRAM STN FLD: ABNORMAL
GRAM STN FLD: ABNORMAL
HCT VFR BLD CALC: 47.2 % — SIGNIFICANT CHANGE UP (ref 39–50)
HCT VFR BLD CALC: 47.2 % — SIGNIFICANT CHANGE UP (ref 39–50)
HGB BLD-MCNC: 15.7 G/DL — SIGNIFICANT CHANGE UP (ref 13–17)
HGB BLD-MCNC: 15.7 G/DL — SIGNIFICANT CHANGE UP (ref 13–17)
MAGNESIUM SERPL-MCNC: 2.1 MG/DL — SIGNIFICANT CHANGE UP (ref 1.6–2.6)
MAGNESIUM SERPL-MCNC: 2.1 MG/DL — SIGNIFICANT CHANGE UP (ref 1.6–2.6)
MCHC RBC-ENTMCNC: 29.8 PG — SIGNIFICANT CHANGE UP (ref 27–34)
MCHC RBC-ENTMCNC: 29.8 PG — SIGNIFICANT CHANGE UP (ref 27–34)
MCHC RBC-ENTMCNC: 33.3 GM/DL — SIGNIFICANT CHANGE UP (ref 32–36)
MCHC RBC-ENTMCNC: 33.3 GM/DL — SIGNIFICANT CHANGE UP (ref 32–36)
MCV RBC AUTO: 89.6 FL — SIGNIFICANT CHANGE UP (ref 80–100)
MCV RBC AUTO: 89.6 FL — SIGNIFICANT CHANGE UP (ref 80–100)
MRSA PCR RESULT.: SIGNIFICANT CHANGE UP
MRSA PCR RESULT.: SIGNIFICANT CHANGE UP
NRBC # BLD: 0 /100 WBCS — SIGNIFICANT CHANGE UP (ref 0–0)
NRBC # BLD: 0 /100 WBCS — SIGNIFICANT CHANGE UP (ref 0–0)
PHOSPHATE SERPL-MCNC: 2.9 MG/DL — SIGNIFICANT CHANGE UP (ref 2.5–4.5)
PHOSPHATE SERPL-MCNC: 2.9 MG/DL — SIGNIFICANT CHANGE UP (ref 2.5–4.5)
PLATELET # BLD AUTO: 234 K/UL — SIGNIFICANT CHANGE UP (ref 150–400)
PLATELET # BLD AUTO: 234 K/UL — SIGNIFICANT CHANGE UP (ref 150–400)
POTASSIUM SERPL-MCNC: 3.5 MMOL/L — SIGNIFICANT CHANGE UP (ref 3.5–5.3)
POTASSIUM SERPL-MCNC: 3.5 MMOL/L — SIGNIFICANT CHANGE UP (ref 3.5–5.3)
POTASSIUM SERPL-SCNC: 3.5 MMOL/L — SIGNIFICANT CHANGE UP (ref 3.5–5.3)
POTASSIUM SERPL-SCNC: 3.5 MMOL/L — SIGNIFICANT CHANGE UP (ref 3.5–5.3)
RBC # BLD: 5.27 M/UL — SIGNIFICANT CHANGE UP (ref 4.2–5.8)
RBC # BLD: 5.27 M/UL — SIGNIFICANT CHANGE UP (ref 4.2–5.8)
RBC # FLD: 14.3 % — SIGNIFICANT CHANGE UP (ref 10.3–14.5)
RBC # FLD: 14.3 % — SIGNIFICANT CHANGE UP (ref 10.3–14.5)
S AUREUS DNA NOSE QL NAA+PROBE: SIGNIFICANT CHANGE UP
S AUREUS DNA NOSE QL NAA+PROBE: SIGNIFICANT CHANGE UP
SODIUM SERPL-SCNC: 140 MMOL/L — SIGNIFICANT CHANGE UP (ref 135–145)
SODIUM SERPL-SCNC: 140 MMOL/L — SIGNIFICANT CHANGE UP (ref 135–145)
SPECIMEN SOURCE: SIGNIFICANT CHANGE UP
SPECIMEN SOURCE: SIGNIFICANT CHANGE UP
WBC # BLD: 9.4 K/UL — SIGNIFICANT CHANGE UP (ref 3.8–10.5)
WBC # BLD: 9.4 K/UL — SIGNIFICANT CHANGE UP (ref 3.8–10.5)
WBC # FLD AUTO: 9.4 K/UL — SIGNIFICANT CHANGE UP (ref 3.8–10.5)
WBC # FLD AUTO: 9.4 K/UL — SIGNIFICANT CHANGE UP (ref 3.8–10.5)

## 2023-12-05 PROCEDURE — 99233 SBSQ HOSP IP/OBS HIGH 50: CPT | Mod: GC

## 2023-12-05 RX ORDER — VANCOMYCIN HCL 1 G
1000 VIAL (EA) INTRAVENOUS EVERY 12 HOURS
Refills: 0 | Status: DISCONTINUED | OUTPATIENT
Start: 2023-12-05 | End: 2023-12-05

## 2023-12-05 RX ORDER — CHLORHEXIDINE GLUCONATE 213 G/1000ML
1 SOLUTION TOPICAL DAILY
Refills: 0 | Status: DISCONTINUED | OUTPATIENT
Start: 2023-12-05 | End: 2023-12-08

## 2023-12-05 RX ADMIN — Medication 650 MILLIGRAM(S): at 23:29

## 2023-12-05 RX ADMIN — AMLODIPINE BESYLATE 5 MILLIGRAM(S): 2.5 TABLET ORAL at 06:24

## 2023-12-05 RX ADMIN — Medication 6 MILLIGRAM(S): at 06:25

## 2023-12-05 RX ADMIN — REMDESIVIR 200 MILLIGRAM(S): 5 INJECTION INTRAVENOUS at 11:22

## 2023-12-05 RX ADMIN — ENOXAPARIN SODIUM 40 MILLIGRAM(S): 100 INJECTION SUBCUTANEOUS at 06:25

## 2023-12-05 RX ADMIN — Medication 50 MILLIGRAM(S): at 06:24

## 2023-12-05 RX ADMIN — CHLORHEXIDINE GLUCONATE 1 APPLICATION(S): 213 SOLUTION TOPICAL at 11:25

## 2023-12-05 RX ADMIN — Medication 50 MILLIGRAM(S): at 17:40

## 2023-12-05 NOTE — PROGRESS NOTE ADULT - PROBLEM SELECTOR PLAN 2
chest xray showing left basilar airspace disease, c/f pneumonia  - DC ceftriaxone and azithromycin for community acquired PNA  - urine legionella and strep pneumoniae neg  - symptomatic care for COVID  - C/w remdesivir 5 day course  - f/u blood and urine cultures   - f/u MRSA PCR  - trend WBC and fever curve  - pro-BNP, trop, and procal WNL chest xray showing left basilar airspace disease, c/f pneumonia  - DC ceftriaxone and azithromycin for community acquired PNA  - urine legionella and strep pneumoniae neg  - symptomatic care for COVID  - C/w remdesivir 5 day course  - blood cx NGTD 12/3/23, ucx 3/23 Neg  - MRSA PCR neg  - trend WBC and fever curve  - pro-BNP, trop, and procal WNL

## 2023-12-05 NOTE — ADVANCED PRACTICE NURSE CONSULT - REASON FOR CONSULT
Wound care consult initiated by RN to assess patient's skin for a possible sacral deep tissue injury present on admission     Reason for Admission: Fall  History of Present Illness:   83 year old male with PMH significant for HTN and BPH s/p TURP presenting to the ED following an unwitnessed fall. Patient reports that for the last 3 days he has been having a non-productive and was recently tested positive for Covid. Today, patient reports that he slipped on a plastic bag and fell. Patient does not remember if he hit is head. Patient's wife and daughter report that after the fall he was slightly confused. Patient is not on any anticoagulation. Patient reports having a temp to 101 earlier today, Patient's wife had Covid last week. At this time, patient denies any fever, chills, chest pain, shortness of breath, nausea, vomiting, abdominal pain, constipation, or diarrhea.     On arrival, Temp 101.5, , /110, RR 22, SpO2 100% on 6L NC. Labs notable for a WBC count of 13.3, Na 134, lactate 2.5->1.4. U/a negative, limited RVP + for Covid. Chest Xray showed left basilar airspace disease. CT head showed no acute intracranial hemorrhage or mass effect. CT cervical spine showed no definite acute fracture or traumatic malalignment. However, please note that fracture of the dens cannot be excluded simply due to extensive motion artifact at this location. In ED, patient was given 2L of IV fluid and 1x dose of ceftriaxone and azithromycin. Blood cultures and urine cultures obtained.

## 2023-12-05 NOTE — PROGRESS NOTE ADULT - ASSESSMENT
83 year old male with PMH significant for HTN and BPH s/p TURP who is being admitted after a mechanical fall likely due to weakness 2/2 to Covid PNA sepsis.

## 2023-12-05 NOTE — ADVANCED PRACTICE NURSE CONSULT - ASSESSMENT
When wound care RN arrived on unit, patient was found sitting in a reclining chair with PCA at bedside. Patient was alert and confused and gave consent to skin consult. Mr Porter is able to turn independently and staff assistance x 2 was provided. Once turned, wound care RN was able to visualize an area of persistent nonblanchable deep red, maroon discoloration with purple hues. Once consult was complete, patient and family were educated regarding the need for routine turning and positioning to prevent pressure injuries and patient was placed in a left side-lying position utilizing pillow positioner assistive devices. When wound care RN arrived on unit, patient was found sitting in a reclining chair with PCA at bedside. Patient was alert and confused and gave consent to skin consult. Mr Porter is able to stand with staff assistance, x 2 was provided. Once standing, wound care RN was able to visualize an area of persistent nonblanchable red discoloration with purple hues present over B/L buttocks/sacral skin, area measures approximately 6cm x 4cm x 0cm- presentation is consistent with a deep tissue injury with incontinence involvement present on admission. On B/L heels there is nonblanchable hyperpigmentation noted to measure approximately 4cm x 4cm x 0cm- cannot rule out a deep tissue injury present on admission. On left elbow, a linear 2cm x 1cm x 0cm abrasion with intact scab is noted related to fall at home. Once consult was complete, patient was placed in reclining chair with feet elevated. Education regarding the need for routine turning and positioning to prevent pressure injuries not appropriate at this time.

## 2023-12-05 NOTE — PROGRESS NOTE ADULT - SUBJECTIVE AND OBJECTIVE BOX
Internal Medicine   Loraine An | PGY-2    OVERNIGHT EVENTS: No acute overnight events.    SUBJECTIVE:       MEDICATIONS  (STANDING):  amLODIPine   Tablet 5 milliGRAM(s) Oral daily  chlorhexidine 2% Cloths 1 Application(s) Topical daily  dexAMETHasone     Tablet 6 milliGRAM(s) Oral daily  enoxaparin Injectable 40 milliGRAM(s) SubCutaneous every 24 hours  influenza  Vaccine (HIGH DOSE) 0.7 milliLiter(s) IntraMuscular once  metoprolol succinate ER 50 milliGRAM(s) Oral two times a day  remdesivir  IVPB   IV Intermittent   remdesivir  IVPB 100 milliGRAM(s) IV Intermittent every 24 hours    MEDICATIONS  (PRN):  acetaminophen     Tablet .. 650 milliGRAM(s) Oral every 6 hours PRN Temp greater or equal to 38C (100.4F), Mild Pain (1 - 3)  aluminum hydroxide/magnesium hydroxide/simethicone Suspension 30 milliLiter(s) Oral every 4 hours PRN Dyspepsia  guaifenesin/dextromethorphan Oral Liquid 10 milliLiter(s) Oral every 4 hours PRN Cough  melatonin 3 milliGRAM(s) Oral at bedtime PRN Insomnia  ondansetron Injectable 4 milliGRAM(s) IV Push every 8 hours PRN Nausea and/or Vomiting        T(F): 98.5 (12-05-23 @ 05:05), Max: 99.5 (12-04-23 @ 12:39)  HR: 75 (12-05-23 @ 05:05) (46 - 83)  BP: 155/72 (12-05-23 @ 05:05) (151/67 - 157/68)  BP(mean): --  RR: 18 (12-05-23 @ 05:05) (18 - 20)  SpO2: 95% (12-05-23 @ 05:05) (90% - 100%)    PHYSICAL EXAM:     GENERAL: NAD, lying in bed comfortably  HEAD:  Atraumatic, Normocephalic  EYES: EOMI, PERRLA, conjunctiva and sclera clear, no nystagmus noted  ENT: Moist mucous membranes,   NECK: Supple, No JVD, trachea midline  CHEST/LUNG: Clear to auscultation bilaterally; No rales, rhonchi, wheezing, or rubs. Unlabored respirations  HEART: Regular rate and rhythm; No murmurs, rubs, or gallops, normal S1/S2  ABDOMEN: normal bowel sounds; Soft, nontender, nondistended, no organomegaly   EXTREMITIES:  2+ Peripheral Pulses, brisk capillary refill. No clubbing, cyanosis, or edema  MSK: No gross deformities noted   Neurological:  A&Ox3, no focal deficits   SKIN: No rashes or lesions  PSYCH: Normal mood, affect     TELEMETRY:    LABS:                        15.2   10.08 )-----------( 253      ( 04 Dec 2023 06:58 )             47.7     12-04    139  |  99  |  13  ----------------------------<  106<H>  3.1<L>   |  28  |  0.83    Ca    9.5      04 Dec 2023 06:58  Phos  2.6     12-04  Mg     2.1     12-04    TPro  7.3  /  Alb  4.2  /  TBili  0.6  /  DBili  x   /  AST  25  /  ALT  21  /  AlkPhos  85  12-04        PT/INR - ( 04 Dec 2023 06:58 )   PT: 12.2 sec;   INR: 1.17 ratio         PTT - ( 04 Dec 2023 06:58 )  PTT:28.4 sec    Creatinine Trend: 0.83<--, 0.80<--  I&O's Summary    04 Dec 2023 07:01  -  05 Dec 2023 06:57  --------------------------------------------------------  IN: 0 mL / OUT: 2000 mL / NET: -2000 mL      BNP    RADIOLOGY & ADDITIONAL STUDIES:             Internal Medicine   Loraine An | PGY-2    OVERNIGHT EVENTS: No acute overnight events.    SUBJECTIVE: Patient was seen and examined at bedside this morning. Denies any nausea/vomiting/diarrhea, headache, shortness of breath, abdominal pain or chest pain/palpitations. Patient responding appropriately to questions and able to make needs known. Vital signs/imaging/telemetry events reviewed.       MEDICATIONS  (STANDING):  amLODIPine   Tablet 5 milliGRAM(s) Oral daily  chlorhexidine 2% Cloths 1 Application(s) Topical daily  dexAMETHasone     Tablet 6 milliGRAM(s) Oral daily  enoxaparin Injectable 40 milliGRAM(s) SubCutaneous every 24 hours  influenza  Vaccine (HIGH DOSE) 0.7 milliLiter(s) IntraMuscular once  metoprolol succinate ER 50 milliGRAM(s) Oral two times a day  remdesivir  IVPB   IV Intermittent   remdesivir  IVPB 100 milliGRAM(s) IV Intermittent every 24 hours    MEDICATIONS  (PRN):  acetaminophen     Tablet .. 650 milliGRAM(s) Oral every 6 hours PRN Temp greater or equal to 38C (100.4F), Mild Pain (1 - 3)  aluminum hydroxide/magnesium hydroxide/simethicone Suspension 30 milliLiter(s) Oral every 4 hours PRN Dyspepsia  guaifenesin/dextromethorphan Oral Liquid 10 milliLiter(s) Oral every 4 hours PRN Cough  melatonin 3 milliGRAM(s) Oral at bedtime PRN Insomnia  ondansetron Injectable 4 milliGRAM(s) IV Push every 8 hours PRN Nausea and/or Vomiting        T(F): 98.5 (12-05-23 @ 05:05), Max: 99.5 (12-04-23 @ 12:39)  HR: 75 (12-05-23 @ 05:05) (46 - 83)  BP: 155/72 (12-05-23 @ 05:05) (151/67 - 157/68)  BP(mean): --  RR: 18 (12-05-23 @ 05:05) (18 - 20)  SpO2: 95% (12-05-23 @ 05:05) (90% - 100%)    PHYSICAL EXAM:     GENERAL: NAD, lying in bed comfortably  HEAD:  Atraumatic, Normocephalic  EYES: EOMI, PERRLA, conjunctiva and sclera clear, no nystagmus noted  ENT: Moist mucous membranes,   NECK: Supple, No JVD, trachea midline  CHEST/LUNG: Clear to auscultation bilaterally; No rales, rhonchi, wheezing, or rubs. Unlabored respirations  HEART: Regular rate and rhythm; No murmurs, rubs, or gallops, normal S1/S2  ABDOMEN: normal bowel sounds; Soft, nontender, nondistended, no organomegaly   EXTREMITIES:  2+ Peripheral Pulses, brisk capillary refill. No clubbing, cyanosis, or edema  MSK: No gross deformities noted   Neurological:  A&Ox3, no focal deficits   SKIN: No rashes or lesions  PSYCH: Normal mood, affect     TELEMETRY:    LABS:                        15.2   10.08 )-----------( 253      ( 04 Dec 2023 06:58 )             47.7     12-04    139  |  99  |  13  ----------------------------<  106<H>  3.1<L>   |  28  |  0.83    Ca    9.5      04 Dec 2023 06:58  Phos  2.6     12-04  Mg     2.1     12-04    TPro  7.3  /  Alb  4.2  /  TBili  0.6  /  DBili  x   /  AST  25  /  ALT  21  /  AlkPhos  85  12-04        PT/INR - ( 04 Dec 2023 06:58 )   PT: 12.2 sec;   INR: 1.17 ratio         PTT - ( 04 Dec 2023 06:58 )  PTT:28.4 sec    Creatinine Trend: 0.83<--, 0.80<--  I&O's Summary    04 Dec 2023 07:01  -  05 Dec 2023 06:57  --------------------------------------------------------  IN: 0 mL / OUT: 2000 mL / NET: -2000 mL      BNP    RADIOLOGY & ADDITIONAL STUDIES:

## 2023-12-05 NOTE — PROGRESS NOTE ADULT - ATTENDING COMMENTS
83M w/ PMHx significant for HTN and BPH s/p TURP p/w fall found to have sepsis and acute hypoxemic respiratory failure likely 2/2 to COVID PNA and acute metabolic encephelopathy        # Acute hypoxemic respiratory failure 2/2 to COVID 19 PNA  # Sepsis   # Acute metabolic encephalopathy  # Hypertension    - CTH no acute pathology; AMS Now resolved and back to baseline   - BCX NGTD  -Cont. BP meds with hold parameters for now  -c/w IV Remdisivirx 5 days / dex x 10 days  -Isolation precautions  -Supportive care  -DVT PPx, Lovenox  -PT

## 2023-12-05 NOTE — PROGRESS NOTE ADULT - PROBLEM SELECTOR PLAN 1
- AMS iso COVID   - Most like 2/2 COVID  - Day 2/5 Remdesivir 12/4  - [facility] w/o significant findings  - Agitated O/N and given zyprexa 5 mg - AMS iso COVID   - Most like 2/2 COVID  - Day 2/5 Remdesivir 12/4  - [facility] w/o significant findings  - Agitated O/N and given zyprexa 5 mg - AMS iso COVID   - Most like 2/2 COVID  - Day 3/5 Remdesivir 12/5  - [facility] w/o significant findings - AMS iso COVID   - Most like 2/2 COVID  - Day 3/5 Remdesivir 12/5  - [facility] w/o significant findings

## 2023-12-05 NOTE — PROGRESS NOTE ADULT - PROBLEM SELECTOR PLAN 4
- at home on Toprol 50mg BID, amlodipine 5mg daily with an additional 5 every other day   - will continue with home regimen at this time

## 2023-12-05 NOTE — ADVANCED PRACTICE NURSE CONSULT - RECOMMEDATIONS
Impression:    B/L heel hyperpigmentation, cannot rule out a deep tissue injury present on admission    Recommendations:    1) continue turning and positioning q2 and PRN utilizing offloading assistive devices  2) continue with routine pericare daily and PRN soiling  3) encourage optimal nutrition  4) waffle cushion when oob to chair  5) B/L LE complete cair air fluidized boots OR lalit lock pillow to offload heels/feet  6) triad protective barrier cream to B/L buttocks/sacrum daily and PRN soiling  7) incontinence management - continue external female urinary catheter to divert urine from skin if incontinent    Plan discussed with DEBORA Clark at bedside    For questions or comments regarding this consult please call  at [phone]. Thank you. Impression:    B/L heel hyperpigmentation, cannot rule out a deep tissue injury present on admission    Recommendations:    1) continue turning and positioning q2 and PRN utilizing offloading assistive devices  2) continue with routine pericare daily and PRN soiling  3) encourage optimal nutrition  4) waffle cushion when oob to chair  5) B/L LE complete cair air fluidized boots OR lalit lock pillow to offload heels/feet  6) triad protective barrier cream to B/L buttocks/sacrum daily and PRN soiling  7) incontinence management - continue external female urinary catheter to divert urine from skin if incontinent    Plan discussed with DEBORA Clark at bedside    For questions or comments regarding this consult please call  at [phone]. Thank you. Impression:    urinary incontinence  left elbow abrasion r/t fall at home  B/L buttocks/sacral deep tissue injury present on admission  B/L heel hyperpigmentation, cannot rule out a deep tissue injury present on admission    Recommendations:    1) continue turning and positioning q2 and PRN utilizing offloading assistive devices  2) continue with routine pericare daily and PRN soiling  3) encourage optimal nutrition  4) waffle cushion when oob to chair  5) B/L LE complete cair air fluidized boots OR lalit lock pillow to offload heels/feet  6) triad protective barrier cream to B/L buttocks/sacrum daily and PRN soiling  7) incontinence management - consider external male urinary catheter to divert urine from skin if incontinent  8) left elbow - cleanse skin and pat dry then apply cavilon to skin daily     Plan discussed with DEBORA Clark at bedside    For questions or comments regarding this consult please call  at [phone]. Thank you. Impression:    urinary incontinence  left elbow abrasion r/t fall at home  B/L buttocks/sacral deep tissue injury present on admission  B/L heel hyperpigmentation, cannot rule out a deep tissue injury present on admission    Recommendations:    1) continue turning and positioning q2 and PRN utilizing offloading assistive devices  2) continue with routine pericare daily and PRN soiling  3) encourage optimal nutrition  4) waffle cushion when oob to chair  5) B/L LE complete cair air fluidized boots OR lalit lock pillow to offload heels/feet  6) triad protective barrier cream to B/L buttocks/sacrum daily and PRN soiling  7) incontinence management - consider external male urinary catheter to divert urine from skin if incontinent  8) left elbow - cleanse skin and pat dry then apply cavilon to skin daily     Plan discussed with DEBORA Clark at bedside    For questions or comments regarding this consult please call  at [phone]. Thank you.

## 2023-12-05 NOTE — PROGRESS NOTE ADULT - PROBLEM SELECTOR PLAN 3
- patient reported slipped on plastic bag but was feeling weak prior to mechanical fall 2/2 infectious process  - no indication for syncopal workup at this time   - CT Head negative for acute process   - CT cervical spine showed no definite acute fracture or traumatic malalignment  - PT consulted.

## 2023-12-06 ENCOUNTER — TRANSCRIPTION ENCOUNTER (OUTPATIENT)
Age: 83
End: 2023-12-06

## 2023-12-06 LAB
-  STAPHYLOCOCCUS EPIDERMIDIS, METHICILLIN RESISTANT: SIGNIFICANT CHANGE UP
-  STAPHYLOCOCCUS EPIDERMIDIS, METHICILLIN RESISTANT: SIGNIFICANT CHANGE UP
ANION GAP SERPL CALC-SCNC: 12 MMOL/L — SIGNIFICANT CHANGE UP (ref 5–17)
ANION GAP SERPL CALC-SCNC: 12 MMOL/L — SIGNIFICANT CHANGE UP (ref 5–17)
BASE EXCESS BLDV CALC-SCNC: 5.1 MMOL/L — HIGH (ref -2–3)
BASE EXCESS BLDV CALC-SCNC: 5.1 MMOL/L — HIGH (ref -2–3)
BUN SERPL-MCNC: 22 MG/DL — SIGNIFICANT CHANGE UP (ref 7–23)
BUN SERPL-MCNC: 22 MG/DL — SIGNIFICANT CHANGE UP (ref 7–23)
CA-I SERPL-SCNC: 1.15 MMOL/L — SIGNIFICANT CHANGE UP (ref 1.15–1.33)
CA-I SERPL-SCNC: 1.15 MMOL/L — SIGNIFICANT CHANGE UP (ref 1.15–1.33)
CALCIUM SERPL-MCNC: 8.9 MG/DL — SIGNIFICANT CHANGE UP (ref 8.4–10.5)
CALCIUM SERPL-MCNC: 8.9 MG/DL — SIGNIFICANT CHANGE UP (ref 8.4–10.5)
CHLORIDE BLDV-SCNC: 100 MMOL/L — SIGNIFICANT CHANGE UP (ref 96–108)
CHLORIDE BLDV-SCNC: 100 MMOL/L — SIGNIFICANT CHANGE UP (ref 96–108)
CHLORIDE SERPL-SCNC: 102 MMOL/L — SIGNIFICANT CHANGE UP (ref 96–108)
CHLORIDE SERPL-SCNC: 102 MMOL/L — SIGNIFICANT CHANGE UP (ref 96–108)
CO2 BLDV-SCNC: 34 MMOL/L — HIGH (ref 22–26)
CO2 BLDV-SCNC: 34 MMOL/L — HIGH (ref 22–26)
CO2 SERPL-SCNC: 28 MMOL/L — SIGNIFICANT CHANGE UP (ref 22–31)
CO2 SERPL-SCNC: 28 MMOL/L — SIGNIFICANT CHANGE UP (ref 22–31)
CREAT SERPL-MCNC: 0.92 MG/DL — SIGNIFICANT CHANGE UP (ref 0.5–1.3)
CREAT SERPL-MCNC: 0.92 MG/DL — SIGNIFICANT CHANGE UP (ref 0.5–1.3)
CULTURE RESULTS: ABNORMAL
CULTURE RESULTS: ABNORMAL
EGFR: 82 ML/MIN/1.73M2 — SIGNIFICANT CHANGE UP
EGFR: 82 ML/MIN/1.73M2 — SIGNIFICANT CHANGE UP
GAS PNL BLDV: 138 MMOL/L — SIGNIFICANT CHANGE UP (ref 136–145)
GAS PNL BLDV: 138 MMOL/L — SIGNIFICANT CHANGE UP (ref 136–145)
GAS PNL BLDV: SIGNIFICANT CHANGE UP
GAS PNL BLDV: SIGNIFICANT CHANGE UP
GLUCOSE BLDV-MCNC: 129 MG/DL — HIGH (ref 70–99)
GLUCOSE BLDV-MCNC: 129 MG/DL — HIGH (ref 70–99)
GLUCOSE SERPL-MCNC: 130 MG/DL — HIGH (ref 70–99)
GLUCOSE SERPL-MCNC: 130 MG/DL — HIGH (ref 70–99)
HCO3 BLDV-SCNC: 32 MMOL/L — HIGH (ref 22–29)
HCO3 BLDV-SCNC: 32 MMOL/L — HIGH (ref 22–29)
HCT VFR BLD CALC: 45.3 % — SIGNIFICANT CHANGE UP (ref 39–50)
HCT VFR BLD CALC: 45.3 % — SIGNIFICANT CHANGE UP (ref 39–50)
HCT VFR BLDA CALC: 45 % — SIGNIFICANT CHANGE UP (ref 39–51)
HCT VFR BLDA CALC: 45 % — SIGNIFICANT CHANGE UP (ref 39–51)
HGB BLD CALC-MCNC: 15.1 G/DL — SIGNIFICANT CHANGE UP (ref 12.6–17.4)
HGB BLD CALC-MCNC: 15.1 G/DL — SIGNIFICANT CHANGE UP (ref 12.6–17.4)
HGB BLD-MCNC: 14.9 G/DL — SIGNIFICANT CHANGE UP (ref 13–17)
HGB BLD-MCNC: 14.9 G/DL — SIGNIFICANT CHANGE UP (ref 13–17)
LACTATE BLDV-MCNC: 2.2 MMOL/L — HIGH (ref 0.5–2)
LACTATE BLDV-MCNC: 2.2 MMOL/L — HIGH (ref 0.5–2)
MAGNESIUM SERPL-MCNC: 2.1 MG/DL — SIGNIFICANT CHANGE UP (ref 1.6–2.6)
MAGNESIUM SERPL-MCNC: 2.1 MG/DL — SIGNIFICANT CHANGE UP (ref 1.6–2.6)
MCHC RBC-ENTMCNC: 29.7 PG — SIGNIFICANT CHANGE UP (ref 27–34)
MCHC RBC-ENTMCNC: 29.7 PG — SIGNIFICANT CHANGE UP (ref 27–34)
MCHC RBC-ENTMCNC: 32.9 GM/DL — SIGNIFICANT CHANGE UP (ref 32–36)
MCHC RBC-ENTMCNC: 32.9 GM/DL — SIGNIFICANT CHANGE UP (ref 32–36)
MCV RBC AUTO: 90.4 FL — SIGNIFICANT CHANGE UP (ref 80–100)
MCV RBC AUTO: 90.4 FL — SIGNIFICANT CHANGE UP (ref 80–100)
METHOD TYPE: SIGNIFICANT CHANGE UP
METHOD TYPE: SIGNIFICANT CHANGE UP
NRBC # BLD: 0 /100 WBCS — SIGNIFICANT CHANGE UP (ref 0–0)
NRBC # BLD: 0 /100 WBCS — SIGNIFICANT CHANGE UP (ref 0–0)
ORGANISM # SPEC MICROSCOPIC CNT: ABNORMAL
PCO2 BLDV: 54 MMHG — SIGNIFICANT CHANGE UP (ref 42–55)
PCO2 BLDV: 54 MMHG — SIGNIFICANT CHANGE UP (ref 42–55)
PH BLDV: 7.38 — SIGNIFICANT CHANGE UP (ref 7.32–7.43)
PH BLDV: 7.38 — SIGNIFICANT CHANGE UP (ref 7.32–7.43)
PHOSPHATE SERPL-MCNC: 3.4 MG/DL — SIGNIFICANT CHANGE UP (ref 2.5–4.5)
PHOSPHATE SERPL-MCNC: 3.4 MG/DL — SIGNIFICANT CHANGE UP (ref 2.5–4.5)
PLATELET # BLD AUTO: 248 K/UL — SIGNIFICANT CHANGE UP (ref 150–400)
PLATELET # BLD AUTO: 248 K/UL — SIGNIFICANT CHANGE UP (ref 150–400)
PO2 BLDV: 53 MMHG — HIGH (ref 25–45)
PO2 BLDV: 53 MMHG — HIGH (ref 25–45)
POTASSIUM BLDV-SCNC: 3 MMOL/L — LOW (ref 3.5–5.1)
POTASSIUM BLDV-SCNC: 3 MMOL/L — LOW (ref 3.5–5.1)
POTASSIUM SERPL-MCNC: 3.3 MMOL/L — LOW (ref 3.5–5.3)
POTASSIUM SERPL-MCNC: 3.3 MMOL/L — LOW (ref 3.5–5.3)
POTASSIUM SERPL-SCNC: 3.3 MMOL/L — LOW (ref 3.5–5.3)
POTASSIUM SERPL-SCNC: 3.3 MMOL/L — LOW (ref 3.5–5.3)
RBC # BLD: 5.01 M/UL — SIGNIFICANT CHANGE UP (ref 4.2–5.8)
RBC # BLD: 5.01 M/UL — SIGNIFICANT CHANGE UP (ref 4.2–5.8)
RBC # FLD: 14.3 % — SIGNIFICANT CHANGE UP (ref 10.3–14.5)
RBC # FLD: 14.3 % — SIGNIFICANT CHANGE UP (ref 10.3–14.5)
SAO2 % BLDV: 82.6 % — SIGNIFICANT CHANGE UP (ref 67–88)
SAO2 % BLDV: 82.6 % — SIGNIFICANT CHANGE UP (ref 67–88)
SODIUM SERPL-SCNC: 142 MMOL/L — SIGNIFICANT CHANGE UP (ref 135–145)
SODIUM SERPL-SCNC: 142 MMOL/L — SIGNIFICANT CHANGE UP (ref 135–145)
SPECIMEN SOURCE: SIGNIFICANT CHANGE UP
SPECIMEN SOURCE: SIGNIFICANT CHANGE UP
WBC # BLD: 7.36 K/UL — SIGNIFICANT CHANGE UP (ref 3.8–10.5)
WBC # BLD: 7.36 K/UL — SIGNIFICANT CHANGE UP (ref 3.8–10.5)
WBC # FLD AUTO: 7.36 K/UL — SIGNIFICANT CHANGE UP (ref 3.8–10.5)
WBC # FLD AUTO: 7.36 K/UL — SIGNIFICANT CHANGE UP (ref 3.8–10.5)

## 2023-12-06 PROCEDURE — 99233 SBSQ HOSP IP/OBS HIGH 50: CPT | Mod: GC

## 2023-12-06 RX ORDER — POTASSIUM CHLORIDE 20 MEQ
40 PACKET (EA) ORAL ONCE
Refills: 0 | Status: COMPLETED | OUTPATIENT
Start: 2023-12-06 | End: 2023-12-06

## 2023-12-06 RX ORDER — ASCORBIC ACID 60 MG
500 TABLET,CHEWABLE ORAL DAILY
Refills: 0 | Status: DISCONTINUED | OUTPATIENT
Start: 2023-12-06 | End: 2023-12-08

## 2023-12-06 RX ORDER — LABETALOL HCL 100 MG
10 TABLET ORAL ONCE
Refills: 0 | Status: COMPLETED | OUTPATIENT
Start: 2023-12-06 | End: 2023-12-06

## 2023-12-06 RX ORDER — METOPROLOL TARTRATE 50 MG
1 TABLET ORAL
Refills: 0 | DISCHARGE

## 2023-12-06 RX ORDER — SENNA PLUS 8.6 MG/1
2 TABLET ORAL AT BEDTIME
Refills: 0 | Status: DISCONTINUED | OUTPATIENT
Start: 2023-12-06 | End: 2023-12-08

## 2023-12-06 RX ORDER — DEXAMETHASONE 0.5 MG/5ML
1 ELIXIR ORAL
Qty: 3 | Refills: 0
Start: 2023-12-06 | End: 2023-12-08

## 2023-12-06 RX ORDER — POLYETHYLENE GLYCOL 3350 17 G/17G
17 POWDER, FOR SOLUTION ORAL DAILY
Refills: 0 | Status: DISCONTINUED | OUTPATIENT
Start: 2023-12-06 | End: 2023-12-08

## 2023-12-06 RX ADMIN — Medication 650 MILLIGRAM(S): at 00:30

## 2023-12-06 RX ADMIN — AMLODIPINE BESYLATE 5 MILLIGRAM(S): 2.5 TABLET ORAL at 06:39

## 2023-12-06 RX ADMIN — CHLORHEXIDINE GLUCONATE 1 APPLICATION(S): 213 SOLUTION TOPICAL at 11:11

## 2023-12-06 RX ADMIN — Medication 500 MILLIGRAM(S): at 18:37

## 2023-12-06 RX ADMIN — Medication 40 MILLIEQUIVALENT(S): at 11:15

## 2023-12-06 RX ADMIN — REMDESIVIR 200 MILLIGRAM(S): 5 INJECTION INTRAVENOUS at 11:08

## 2023-12-06 RX ADMIN — SENNA PLUS 2 TABLET(S): 8.6 TABLET ORAL at 22:21

## 2023-12-06 RX ADMIN — Medication 6 MILLIGRAM(S): at 06:39

## 2023-12-06 RX ADMIN — Medication 1 TABLET(S): at 18:37

## 2023-12-06 RX ADMIN — Medication 50 MILLIGRAM(S): at 06:39

## 2023-12-06 RX ADMIN — POLYETHYLENE GLYCOL 3350 17 GRAM(S): 17 POWDER, FOR SOLUTION ORAL at 11:15

## 2023-12-06 RX ADMIN — ENOXAPARIN SODIUM 40 MILLIGRAM(S): 100 INJECTION SUBCUTANEOUS at 06:40

## 2023-12-06 RX ADMIN — Medication 50 MILLIGRAM(S): at 18:38

## 2023-12-06 RX ADMIN — Medication 10 MILLIGRAM(S): at 22:20

## 2023-12-06 NOTE — DISCHARGE NOTE PROVIDER - NSDCCPTREATMENT_GEN_ALL_CORE_FT
PRINCIPAL PROCEDURE  Procedure: CT head  Findings and Treatment:   IMPRESSION:  BRAIN:  No acute intracranial hemorrhage or mass effect.  CERVICAL SPINE:  Motion degraded exam severely limits evaluation. Within these limitations   there is no definite acute fracture or traumatic malalignment. However,   please note that fracture of the dens cannot be excluded simply due to   extensive motion artifact at this location.      SECONDARY PROCEDURE  Procedure: CT cervical spine  Findings and Treatment: FINDINGS:  Motion degraded exam.  ALIGNMENT:  No subluxations. The lateral masses of C1 are normally   aligned, and the atlantodental interval is normal.  VERTEBRAL BODIES:  Normal in height. No fracture. The dens is intact.  SPINAL CANAL AND NEURAL FORAMINA: Significant motion degradation limits   evaluation.  SOFT TISSUES: No prevertebral soft tissue swelling.  IMPRESSION:  BRAIN:  No acute intracranial hemorrhage or mass effect.  CERVICAL SPINE:  Motion degraded exam severely limits evaluation. Within these limitations   there is no definite acute fracture or traumatic malalignment. However,   please note that fracture of the dens cannot be excluded simply due to   extensive motion artifact at this location.

## 2023-12-06 NOTE — DIETITIAN INITIAL EVALUATION ADULT - ADD RECOMMEND
1) Recommend continue current diet as tolerated: Kosher, DASH/TLC. Defer diet texture/consistency to Speech Language Pathologist/Medical Team.   2) Recommend Ensure Plus High Protein 2x/day to optimize protein-energy intake.   3) Recommend multivitamin and vitamin C daily for wound healing unless contraindicated.   4) RD to allow for double protein upon pt request to assist with wound healing and meeting estimated protein-energy needs.   5) Monitor electrolytes, replete as needed.  6) Monitor bowel movements and bowel movement regularity.  7) Monitor nutritional intake, tolerance to diet prescription, weights, labs, and skin integrity.

## 2023-12-06 NOTE — DIETITIAN INITIAL EVALUATION ADULT - OTHER CALCULATIONS
Estimated protein-energy needs calculated using current dosing weight of 182.9 pounds (12/4) with consideration for deep tissue injury.   Defer fluid calculations to the medical team.

## 2023-12-06 NOTE — DISCHARGE NOTE PROVIDER - NSDCMRMEDTOKEN_GEN_ALL_CORE_FT
amLODIPine 5 mg oral tablet: 1 tab(s) orally once a day  Toprol-XL 50 mg oral tablet, extended release: 1 tab(s) orally 2 times a day   amLODIPine 10 mg oral tablet: 1 tab(s) orally once a day  ascorbic acid 500 mg oral tablet: 1 tab(s) orally once a day  Multiple Vitamins oral tablet: 1 tab(s) orally once a day  oxyCODONE 5 mg oral tablet: 1 tab(s) orally every 8 hours as needed for pain MDD MDD:15mg  Toprol-XL 50 mg oral tablet, extended release: 1 tab(s) orally 2 times a day

## 2023-12-06 NOTE — PROGRESS NOTE ADULT - PROBLEM SELECTOR PLAN 2
chest xray showing left basilar airspace disease, c/f pneumonia  - DC ceftriaxone and azithromycin for community acquired PNA  - urine legionella and strep pneumoniae neg  - symptomatic care for COVID  - C/w remdesivir 5 day course  - blood cx NGTD 12/3/23, ucx 3/23 Neg  - MRSA PCR neg  - trend WBC and fever curve  - pro-BNP, trop, and procal WNL

## 2023-12-06 NOTE — DIETITIAN INITIAL EVALUATION ADULT - PROBLEM SELECTOR PLAN 2
- patient reported slipped on plastic bag but was feeling weak prior to mechanical fall 2/2 infectious process  - no indication for syncopal workup at this time   - CT Head negative for acute process   - CT cervical spine showed no definite acute fracture or traumatic malalignment  - Pt evaluation

## 2023-12-06 NOTE — DISCHARGE NOTE PROVIDER - ATTENDING DISCHARGE PHYSICAL EXAMINATION:
PHYSICAL EXAM  GENERAL: NAD, lying comfortably in bed  HEAD:  Atraumatic, Normocephalic  EYES: EOMI b/l,  conjunctiva and sclera clear  NECK: Supple, No LAD   CHEST/LUNG: Clear to auscultation bilaterally; No wheeze or ronchi  HEART: Regular rate and rhythm; S1 and S2 present, No murmurs, rubs, or gallops  ABDOMEN: Soft, Nontender, Nondistended; Bowel sounds present  EXTREMITIES:  2+ Peripheral Pulses, No edema  NEURO: AAOx3, non-focal   SKIN: No rashes or lesions

## 2023-12-06 NOTE — PROGRESS NOTE ADULT - ATTENDING COMMENTS
83M w/ PMHx significant for HTN and BPH s/p TURP p/w fall found to have sepsis and acute hypoxemic respiratory failure likely 2/2 to COVID PNA and acute metabolic encephelopathy        # Acute hypoxemic respiratory failure 2/2 to COVID 19 PNA  # Sepsis   # Acute metabolic encephalopathy  # Hypertension    - CTH no acute pathology; AMS Now resolved and back to baseline   - BCX NGTD  -Cont. BP meds with hold parameters for now; can increase norvasc to 10mg if need be  -c/w IV Remdisivirx 5 days / dex x 10 days  -Isolation precautions  -Supportive care  -DVT PPx, Lovenox  -PT rec JUANITA; fam in agreement d/w son Nivon    likely dc friday after completion of remdisivir

## 2023-12-06 NOTE — DIETITIAN INITIAL EVALUATION ADULT - NSFNSGIIOFT_GEN_A_CORE
Pt reports no nausea or vomiting at this time.   - Note: Pt is ordered for aluminum hydroxide/magnesium hydroxide/simethicone Suspension and zofran (per orders).  Pt reports constipation at this time.   - Bowel Regimen: Pt is ordered for Miralax and Senna (per orders).   - Last BM: 12/5 (per patient).  RD provided constipation nutrition therapy, encouraged adequate hydration and consumption of fiber-rich foods. Monitor bowel movements and bowel movement regularity. Adjust bowel regimen as needed.

## 2023-12-06 NOTE — DIETITIAN INITIAL EVALUATION ADULT - OTHER INFO
Weights:  - UBW (per patient): 183 pounds (consistent this year)  - Dosing Weight (per chart): 182.9 pounds (12/4)  - Daily Weights (per flow sheets): No daily weights noted to assess.   - Bed Scale Weight (taken by RD): RD unable to obtain bed scale weight as pt was seated in a chair at the time of RD interview.   - Per [facility] HIE: 197 pounds (08/26/2022)   Limited weight history available to assess. Per pt, pt with no recent significant weight changes and good PO intake prior to admission. RD to continue to monitor weights and trends as able.     Pt with acute metabolic encephalopathy (per chart).  - Per chart, AMS in setting of COVID (per chart).  - Ordered for Remdesivir (per orders).    Orders:  - Pt ordered for Decadron (per orders), which may elevate blood glucose levels. Glucose 130 mg/dL today 12/6 (per chart).    Potassium low today, 3.3 mmol/L (12/6). Pt is s/p potassium chloride solution today, 12/6 (per orders).  Weights:  - UBW (per patient): 183 pounds (consistent this year)  - Dosing Weight (per chart): 182.9 pounds (12/4)  - Daily Weights (per flow sheets): No daily weights noted to assess.   - Bed Scale Weight (taken by RD): RD unable to obtain bed scale weight as pt was seated in a chair at the time of RD interview.   - Per [facility] HIE: 197 pounds (08/26/2022)   Limited weight history available to assess. Per pt, pt with no recent significant weight changes and good PO intake prior to admission. RD to continue to monitor weights and trends as able.     Pt with acute metabolic encephalopathy (per chart).  - Per chart, AMS in setting of COVID (per chart).  - Ordered for Remdesivir (per orders).    Orders:  - Pt ordered for Decadron (per orders), which may elevate blood glucose levels. Glucose 130 mg/dL today 12/6 (per chart).    Potassium low today, 3.3 mmol/L (12/6). Pt is s/p potassium chloride solution today, 12/6 (per orders).

## 2023-12-06 NOTE — DISCHARGE NOTE PROVIDER - CARE PROVIDER_API CALL
Paul Ellis  Gastroenterology  [address], [address] [place] [address]  Phone: [phone]  Fax: [phone]  Established Patient  Follow Up Time: 2 weeks   Paul Ellis  Gastroenterology  [address], [address] [place] [address]  Phone: [phone]  Fax: [phone]  Established Patient  Follow Up Time: 2 weeks

## 2023-12-06 NOTE — DIETITIAN INITIAL EVALUATION ADULT - NSFNSPHYEXAMSKINFT_GEN_A_CORE
Per Wound Care Documentation on 12/5:   "left elbow abrasion r/t fall at home  B/L buttocks/sacral deep tissue injury present on admission  B/L heel hyperpigmentation, cannot rule out a deep tissue injury present on admission"

## 2023-12-06 NOTE — DISCHARGE NOTE PROVIDER - NSDCFUADDAPPT_GEN_ALL_CORE_FT
APPTS ARE READY TO BE MADE: [ ] YES    Best Family or Patient Contact (if needed):  Arcadio Jttamar (patient) [phone]    Additional Information about above appointments (if needed):    1: Please schedule a follow-up appointment with your primary care doctor within 2 weeks of being discharged from the hospital.   2:   3:     Other comments or requests:    APPTS ARE READY TO BE MADE: [ ] YES    Best Family or Patient Contact (if needed):  Arcadio Jttamar (patient) [phone]    Additional Information about above appointments (if needed):    1: Please schedule a follow-up appointment with your primary care doctor within 2 weeks of being discharged from the hospital.   2:   3:     Other comments or requests:

## 2023-12-06 NOTE — DIETITIAN INITIAL EVALUATION ADULT - ORAL INTAKE PTA/DIET HISTORY
Nutrition assessment completed at bedside, remaining subjective information obtained from pt's wife, Stephy, through telephone call. Pt reported food appetite and PO intake prior to admission, no recent changes. Pt's wife endorsed "excellent" appetite prior to admission. Pt reported UBW of ~183 pounds prior to admission, reported no recent significant weight changes. Pt's wife reported food allergy to wine and grape juice ("urinary issues"). Pt's wife stated when the pt consumes Tuna or [place], he experiences coughing and phlegm production, stated it is unclear if it's an allergy. RD updated allergies in chart as a precautionary measure.  Nutrition assessment completed at bedside, remaining subjective information obtained from pt's wife, Stephy, through telephone call. Pt reported food appetite and PO intake prior to admission, no recent changes. Pt's wife endorsed "excellent" appetite prior to admission. Pt reported UBW of ~183 pounds prior to admission, reported no recent significant weight changes. Pt's wife reported food allergy to wine and grape juice ("urinary issues"). Pt's wife stated when the pt consumes Tuna or [place], he experiences coughing and phlegm production, stated it is unclear if it's an allergy. RD updated allergies in chart as a precautionary measure.

## 2023-12-06 NOTE — DIETITIAN INITIAL EVALUATION ADULT - REASON INDICATOR FOR ASSESSMENT
RD consult warranted for   Source: Patient, Patient's Wife (Perie), Electronic Medical Record  Chart reviewed, events noted.

## 2023-12-06 NOTE — DIETITIAN INITIAL EVALUATION ADULT - PERTINENT MEDS FT
MEDICATIONS  (STANDING):  amLODIPine   Tablet 5 milliGRAM(s) Oral daily  chlorhexidine 4% Liquid 1 Application(s) Topical daily  dexAMETHasone     Tablet 6 milliGRAM(s) Oral daily  enoxaparin Injectable 40 milliGRAM(s) SubCutaneous every 24 hours  influenza  Vaccine (HIGH DOSE) 0.7 milliLiter(s) IntraMuscular once  metoprolol succinate ER 50 milliGRAM(s) Oral two times a day  polyethylene glycol 3350 17 Gram(s) Oral daily  remdesivir  IVPB 100 milliGRAM(s) IV Intermittent every 24 hours  remdesivir  IVPB   IV Intermittent   senna 2 Tablet(s) Oral at bedtime    MEDICATIONS  (PRN):  acetaminophen     Tablet .. 650 milliGRAM(s) Oral every 6 hours PRN Temp greater or equal to 38C (100.4F), Mild Pain (1 - 3)  aluminum hydroxide/magnesium hydroxide/simethicone Suspension 30 milliLiter(s) Oral every 4 hours PRN Dyspepsia  guaifenesin/dextromethorphan Oral Liquid 10 milliLiter(s) Oral every 4 hours PRN Cough  melatonin 3 milliGRAM(s) Oral at bedtime PRN Insomnia  ondansetron Injectable 4 milliGRAM(s) IV Push every 8 hours PRN Nausea and/or Vomiting

## 2023-12-06 NOTE — DIETITIAN INITIAL EVALUATION ADULT - NSFNSADHERENCEPTAFT_GEN_A_CORE
Prior to admission, pt stated he follows a Kosher diet, no other therapeutic restrictions. Pt endorsed good appetite and PO intake prior to admission, no recent changes.

## 2023-12-06 NOTE — DIETITIAN INITIAL EVALUATION ADULT - PERTINENT LABORATORY DATA
12-06    142  |  102  |  22  ----------------------------<  130<H>  3.3<L>   |  28  |  0.92    Ca    8.9      06 Dec 2023 06:20  Phos  3.4     12-06  Mg     2.1     12-06    A1C with Estimated Average Glucose Result: 6.2 % (12-04-23 @ 06:58)

## 2023-12-06 NOTE — DISCHARGE NOTE PROVIDER - NSDCCPCAREPLAN_GEN_ALL_CORE_FT
PRINCIPAL DISCHARGE DIAGNOSIS  Diagnosis: 2019 novel coronavirus disease (COVID-19)  Assessment and Plan of Treatment:       SECONDARY DISCHARGE DIAGNOSES  Diagnosis: Acute metabolic encephalopathy  Assessment and Plan of Treatment:     Diagnosis: HTN (hypertension)  Assessment and Plan of Treatment:      ambulatory PRINCIPAL DISCHARGE DIAGNOSIS  Diagnosis: 2019 novel coronavirus disease (COVID-19)  Assessment and Plan of Treatment: You came to the hospital because you had increased oxygen requirements after being diagnosed with COVID. In some people, COVID-19 leads to serious problems like pneumonia, which can cause a person to not get enough oxygen. This is what caused the change in your oxygen requirements.      SECONDARY DISCHARGE DIAGNOSES  Diagnosis: Acute metabolic encephalopathy  Assessment and Plan of Treatment:     Diagnosis: HTN (hypertension)  Assessment and Plan of Treatment:      normal for race PRINCIPAL DISCHARGE DIAGNOSIS  Diagnosis: 2019 novel coronavirus disease (COVID-19)  Assessment and Plan of Treatment: You came to the hospital because you had increased oxygen requirements after being diagnosed with COVID. In some people, COVID-19 leads to serious problems like pneumonia, which can cause a person to not get enough oxygen. This is what caused the change in your oxygen requirements. You were given 5 days of IV Remdesivir and you were started on a steroid (Dexamethason 6mg once/day). Please continue to take this steroid after you are discharged, to complete a full 10-day course.   You were placed on a nasal cannula for supplemental oxygen while you were hospitalized. Please follow-up with your primary care doctor within 2 weeks of being discharged from the hospital.   Please seek immediated medical attention if you experience sudden difficulty breathing, or no improvement in your symptoms.      SECONDARY DISCHARGE DIAGNOSES  Diagnosis: Accident due to mechanical fall without injury  Assessment and Plan of Treatment: You were reported to fall at home per your family. This was due to you feeling weak, which was most likely due to the COVID-19 virus. A CT scan of your head did not show any acute processes. Your CT cervical spine did not show any definite acute fractures or traumatic malalignment.     PRINCIPAL DISCHARGE DIAGNOSIS  Diagnosis: 2019 novel coronavirus disease (COVID-19)  Assessment and Plan of Treatment: You came to the hospital because you had increased oxygen requirements after being diagnosed with COVID. In some people, COVID-19 leads to serious problems like pneumonia, which can cause a person to not get enough oxygen. This is what caused the change in your oxygen requirements. You were given 5 days of IV Remdesivir and you were started on a steroid (Dexamethason 6mg once/day) for 4 days that was discontinued because you became increasingly confused while you were hospitalized.  You were placed on a nasal cannula for supplemental oxygen while you were hospitalized. Please follow-up with your primary care doctor within 2 weeks of being discharged from the hospital.   Please seek immediated medical attention if you experience sudden difficulty breathing, or no improvement in your symptoms.      SECONDARY DISCHARGE DIAGNOSES  Diagnosis: Accident due to mechanical fall without injury  Assessment and Plan of Treatment: You were reported to fall at home per your family. This was due to you feeling weak, which was most likely due to the COVID-19 virus. A CT scan of your head did not show any acute processes. Your CT cervical spine did not show any definite acute fractures or traumatic malalignment. Please follow-up with your primary care doctor after you are discharged from rehab.    Diagnosis: HTN (hypertension)  Assessment and Plan of Treatment: Your blood pressure medications were held at first     PRINCIPAL DISCHARGE DIAGNOSIS  Diagnosis: 2019 novel coronavirus disease (COVID-19)  Assessment and Plan of Treatment: You came to the hospital because you had increased oxygen requirements after being diagnosed with COVID. In some people, COVID-19 leads to serious problems like pneumonia, which can cause a person to not get enough oxygen. This is what caused the change in your oxygen requirements. You were given 5 days of IV Remdesivir and you were started on a steroid (Dexamethason 6mg once/day) for 4 days that was discontinued because you became increasingly confused while you were hospitalized.  You were placed on a nasal cannula for supplemental oxygen while you were hospitalized. Please follow-up with your primary care doctor within 2 weeks of being discharged from the hospital.   Please seek immediated medical attention if you experience sudden difficulty breathing, or no improvement in your symptoms.      SECONDARY DISCHARGE DIAGNOSES  Diagnosis: Accident due to mechanical fall without injury  Assessment and Plan of Treatment: You were reported to fall at home per your family. This was due to you feeling weak, which was most likely due to the COVID-19 virus. A CT scan of your head did not show any acute processes. Your CT cervical spine did not show any definite acute fractures or traumatic malalignment. Please follow-up with your primary care doctor after you are discharged from rehab.    Diagnosis: HTN (hypertension)  Assessment and Plan of Treatment: Your blood pressure medications were held at first because your blood pressure was low. During your hospitalization, your home blood pressure medication (Amlodipine) was increased to 10 mg once a day because your blood pressures became elevated. Please follow up with your primary care doctor when you are discharged from rehab to make sure that you're on the correct medications and dose of medication to control your blood pressure. Please seek immediate medical attention if you experience chest pain, severe headache, or difficulty breathing.

## 2023-12-06 NOTE — DIETITIAN INITIAL EVALUATION ADULT - PERSON TAUGHT/METHOD
Provided education on high-protein diet for wound healing and preservation of lean body mass. Encouraged consumption of HBV foods, prioritizing protein foods first at meal times, and importance of  meeting adequate protein-energy needs.     RD provided constipation nutrition therapy, encouraged adequate hydration and consumption of fiber-rich foods.     Pt and wife aware RD remains available./verbal instruction/patient instructed

## 2023-12-06 NOTE — DISCHARGE NOTE PROVIDER - HOSPITAL COURSE
HPI:  83 year old male with PMH significant for HTN and BPH s/p TURP presenting to the ED following an unwitnessed fall. Patient reports that for the last 3 days he has been having a non-productive and was recently tested positive for Covid. Today, patient reports that he slipped on a plastic bag and fell. Patient does not remember if he hit is head. Patient's wife and daughter report that after the fall he was slightly confused. Patient is not on any anticoagulation. Patient reports having a temp to 101 earlier today, Patient's wife had Covid last week. At this time, patient denies any fever, chills, chest pain, shortness of breath, nausea, vomiting, abdominal pain, constipation, or diarrhea.     On arrival, Temp 101.5, , /110, RR 22, SpO2 100% on 6L NC. Labs notable for a WBC count of 13.3, Na 134, lactate 2.5->1.4. U/a negative, limited RVP + for Covid. Chest Xray showed left basilar airspace disease. CT head showed no acute intracranial hemorrhage or mass effect. CT cervical spine showed no definite acute fracture or traumatic malalignment. However, please note that fracture of the dens cannot be excluded simply due to extensive motion artifact at this location. In ED, patient was given 2L of IV fluid and 1x dose of ceftriaxone and azithromycin. Blood cultures and urine cultures obtained.  (03 Dec 2023 23:23)    Hospital Course:  Pt received one dose of CTX and azithromycin in the ED. Pt had acute metabolic encephalopathy 2/2 COVID PNA and was started on Remdesivir for 5 days and dexamethasone for 10 days. Pt's mental status continued to improve through pt's hospitalization. Pt was weaned to 2L N/C and ultimately was weaned to room air. Pt was also seen by wound care for sacral wound. One blood culture was positive for Staph epi, but culture was deemed to be a contaminant as other blood cultures were negative. Pt was evaluated by PT and was recommended ___.    Important Medication Changes and Reason:  CONTINUE dexamethasone 6mg QD to complete 10 days    Active or Pending Issues Requiring Follow-up:  - F/u w/ PCP    Advanced Directives:   [X] Full code  [ ] DNR  [ ] Hospice    Discharge Diagnoses:  #COVID PNA  #Acute metabolic encephalopathy  #Hypertension         HPI:  83 year old male with PMH significant for HTN and BPH s/p TURP presenting to the ED following an unwitnessed fall. Patient reports that for the last 3 days he has been having a non-productive and was recently tested positive for Covid. Today, patient reports that he slipped on a plastic bag and fell. Patient does not remember if he hit is head. Patient's wife and daughter report that after the fall he was slightly confused. Patient is not on any anticoagulation. Patient reports having a temp to 101 earlier today, Patient's wife had Covid last week. At this time, patient denies any fever, chills, chest pain, shortness of breath, nausea, vomiting, abdominal pain, constipation, or diarrhea.     On arrival, Temp 101.5, , /110, RR 22, SpO2 100% on 6L NC. Labs notable for a WBC count of 13.3, Na 134, lactate 2.5->1.4. U/a negative, limited RVP + for Covid. Chest Xray showed left basilar airspace disease. CT head showed no acute intracranial hemorrhage or mass effect. CT cervical spine showed no definite acute fracture or traumatic malalignment. However, please note that fracture of the dens cannot be excluded simply due to extensive motion artifact at this location. In ED, patient was given 2L of IV fluid and 1x dose of ceftriaxone and azithromycin. Blood cultures and urine cultures obtained.  (03 Dec 2023 23:23)    Hospital Course:  Pt received one dose of CTX and azithromycin in the ED. Pt had acute metabolic encephalopathy 2/2 COVID PNA and was started on Remdesivir for 5 days and dexamethasone for 10 days. Pt's mental status continued to improve through pt's hospitalization. Pt was weaned to 2L N/C and ultimately was weaned to room air. Pt was also seen by wound care for sacral wound. One blood culture was positive for Staph epi, but culture was deemed to be a contaminant as other blood cultures were negative. Pt was evaluated by PT and was recommended [facility]. On day of discharge, pt is afebrile and medically stable to be discharged to [facility].     Important Medication Changes and Reason:  CONTINUE dexamethasone 6mg QD to complete 10 days    Active or Pending Issues Requiring Follow-up:  - F/u w/ PCP      Advanced Directives:   [X] Full code  [ ] DNR  [ ] Hospice    Discharge Diagnoses:  #COVID PNA  #Acute metabolic encephalopathy  #Hypertension         HPI:  83 year old male with PMH significant for HTN and BPH s/p TURP presenting to the ED following an unwitnessed fall. Patient reports that for the last 3 days he has been having a non-productive and was recently tested positive for Covid. Today, patient reports that he slipped on a plastic bag and fell. Patient does not remember if he hit is head. Patient's wife and daughter report that after the fall he was slightly confused. Patient is not on any anticoagulation. Patient reports having a temp to 101 earlier today, Patient's wife had Covid last week. At this time, patient denies any fever, chills, chest pain, shortness of breath, nausea, vomiting, abdominal pain, constipation, or diarrhea.     On arrival, Temp 101.5, , /110, RR 22, SpO2 100% on 6L NC. Labs notable for a WBC count of 13.3, Na 134, lactate 2.5->1.4. U/a negative, limited RVP + for Covid. Chest Xray showed left basilar airspace disease. CT head showed no acute intracranial hemorrhage or mass effect. CT cervical spine showed no definite acute fracture or traumatic malalignment. However, please note that fracture of the dens cannot be excluded simply due to extensive motion artifact at this location. In ED, patient was given 2L of IV fluid and 1x dose of ceftriaxone and azithromycin. Blood cultures and urine cultures obtained.  (03 Dec 2023 23:23)    Hospital Course:  Pt received one dose of CTX and azithromycin in the ED. Pt had acute metabolic encephalopathy 2/2 COVID PNA and was started on Remdesivir for 5 days and dexamethasone for 10 days. Pt's mental status continued to improve through pt's hospitalization. Pt was weaned to 2L N/C and ultimately was weaned to room air. Pt was also seen by wound care for sacral wound. One blood culture was positive for Staph epi, but culture was deemed to be a contaminant as other blood cultures were negative. Pt was evaluated by PT and was recommended [facility]. On day of discharge, pt is afebrile and medically stable to be discharged to [facility].     Important Medication Changes and Reason:  CONTINUE dexamethasone 6mg QD to complete 10 days    Active or Pending Issues Requiring Follow-up:  - F/u w/ PCP      Advanced Directives:   [X] Full code  [ ] DNR  [ ] Hospice    Discharge Diagnoses:  #COVID PNA  #Acute metabolic encephalopathy  #Hypertension         HPI:  83 year old male with PMH significant for HTN and BPH s/p TURP presenting to the ED following an unwitnessed fall. Patient reports that for the last 3 days he has been having a non-productive and was recently tested positive for Covid. Today, patient reports that he slipped on a plastic bag and fell. Patient does not remember if he hit is head. Patient's wife and daughter report that after the fall he was slightly confused. Patient is not on any anticoagulation. Patient reports having a temp to 101 earlier today, Patient's wife had Covid last week. At this time, patient denies any fever, chills, chest pain, shortness of breath, nausea, vomiting, abdominal pain, constipation, or diarrhea.     On arrival, Temp 101.5, , /110, RR 22, SpO2 100% on 6L NC. Labs notable for a WBC count of 13.3, Na 134, lactate 2.5->1.4. U/a negative, limited RVP + for Covid. Chest Xray showed left basilar airspace disease. CT head showed no acute intracranial hemorrhage or mass effect. CT cervical spine showed no definite acute fracture or traumatic malalignment. However, please note that fracture of the dens cannot be excluded simply due to extensive motion artifact at this location. In ED, patient was given 2L of IV fluid and 1x dose of ceftriaxone and azithromycin. Blood cultures and urine cultures obtained.  (03 Dec 2023 23:23)    Hospital Course:  Pt received one dose of CTX and azithromycin in the ED. Pt had acute metabolic encephalopathy 2/2 COVID PNA and was started on Remdesivir for 5 days and dexamethasone for 10 days. Pt's mental status continued to improve through pt's hospitalization. Pt was weaned to 2L N/C and ultimately was weaned to room air. Pt was also seen by wound care for sacral wound. One blood culture was positive for Staph epi, but culture was deemed to be a contaminant as other blood cultures were negative. Pt was evaluated by PT and was recommended [facility]. On day of discharge, pt is afebrile and medically stable to be discharged to [facility].     Important Medication Changes and Reason:      Active or Pending Issues Requiring Follow-up:  - F/u w/ PCP      Advanced Directives:   [X] Full code  [ ] DNR  [ ] Hospice    Discharge Diagnoses:  #COVID PNA  #Acute metabolic encephalopathy  #Hypertension         HPI:  83 year old male with PMH significant for HTN and BPH s/p TURP presenting to the ED following an unwitnessed fall. Patient reports that for the last 3 days he has been having a non-productive and was recently tested positive for Covid. Today, patient reports that he slipped on a plastic bag and fell. Patient does not remember if he hit is head. Patient's wife and daughter report that after the fall he was slightly confused. Patient is not on any anticoagulation. Patient reports having a temp to 101 earlier today, Patient's wife had Covid last week. At this time, patient denies any fever, chills, chest pain, shortness of breath, nausea, vomiting, abdominal pain, constipation, or diarrhea.     On arrival, Temp 101.5, , /110, RR 22, SpO2 100% on 6L NC. Labs notable for a WBC count of 13.3, Na 134, lactate 2.5->1.4. U/a negative, limited RVP + for Covid. Chest Xray showed left basilar airspace disease. CT head showed no acute intracranial hemorrhage or mass effect. CT cervical spine showed no definite acute fracture or traumatic malalignment. However, please note that fracture of the dens cannot be excluded simply due to extensive motion artifact at this location. In ED, patient was given 2L of IV fluid and 1x dose of ceftriaxone and azithromycin. Blood cultures and urine cultures obtained.  (03 Dec 2023 23:23)    Hospital Course:  Pt received one dose of CTX and azithromycin in the ED. Pt had acute metabolic encephalopathy 2/2 COVID PNA and was started on Remdesivir for 5 days and dexamethasone for 10 days. Pt's mental status continued to improve through pt's hospitalization. Pt was weaned to 2L N/C and ultimately was weaned to room air. Pt was also seen by wound care for sacral wound. One blood culture was positive for Staph epi, but culture was deemed to be a contaminant as other blood cultures were negative. Pt was evaluated by PT and was recommended [facility]. On day of discharge, pt is afebrile and medically stable to be discharged to [facility].     Important Medication Changes and Reason:      Active or Pending Issues Requiring Follow-up:  - F/u w/ PCP      Advanced Directives:   [X] Full code  [ ] DNR  [ ] Hospice    Discharge Diagnoses:  #COVID PNA  #Acute metabolic encephalopathy  #Hypertension         HPI:  83 year old male with PMH significant for HTN and BPH s/p TURP presenting to the ED following an unwitnessed fall. Patient reports that for the last 3 days he has been having a non-productive and was recently tested positive for Covid. Today, patient reports that he slipped on a plastic bag and fell. Patient does not remember if he hit is head. Patient's wife and daughter report that after the fall he was slightly confused. Patient is not on any anticoagulation. Patient reports having a temp to 101 earlier today, Patient's wife had Covid last week. At this time, patient denies any fever, chills, chest pain, shortness of breath, nausea, vomiting, abdominal pain, constipation, or diarrhea.     On arrival, Temp 101.5, , /110, RR 22, SpO2 100% on 6L NC. Labs notable for a WBC count of 13.3, Na 134, lactate 2.5->1.4. U/a negative, limited RVP + for Covid. Chest Xray showed left basilar airspace disease. CT head showed no acute intracranial hemorrhage or mass effect. CT cervical spine showed no definite acute fracture or traumatic malalignment. However, please note that fracture of the dens cannot be excluded simply due to extensive motion artifact at this location. In ED, patient was given 2L of IV fluid and 1x dose of ceftriaxone and azithromycin. Blood cultures and urine cultures obtained.  (03 Dec 2023 23:23)    Hospital Course:  Pt received one dose of CTX and azithromycin in the ED. Pt had acute metabolic encephalopathy 2/2 COVID PNA and was started on Remdesivir for 5 days and dexamethasone for 10 days. Pt's mental status continued to improve through pt's hospitalization. Pt was weaned to 2L N/C and ultimately was weaned to room air. Pt was also seen by wound care for sacral wound. One blood culture was positive for Staph epi, but culture was deemed to be a contaminant as other blood cultures were negative. Pt was evaluated by PT and was recommended [facility]. On day of discharge, pt is afebrile and medically stable to be discharged to [facility].     Important Medication Changes and Reason:      Active or Pending Issues Requiring Follow-up:  - F/u w/ PCP    Advanced Directives:   [X] Full code  [ ] DNR  [ ] Hospice    Discharge Diagnoses:  #COVID PNA  #Acute metabolic encephalopathy  #Hypertension         HPI:  83 year old male with PMH significant for HTN and BPH s/p TURP presenting to the ED following an unwitnessed fall. Patient reports that for the last 3 days he has been having a non-productive and was recently tested positive for Covid. Today, patient reports that he slipped on a plastic bag and fell. Patient does not remember if he hit is head. Patient's wife and daughter report that after the fall he was slightly confused. Patient is not on any anticoagulation. Patient reports having a temp to 101 earlier today, Patient's wife had Covid last week. At this time, patient denies any fever, chills, chest pain, shortness of breath, nausea, vomiting, abdominal pain, constipation, or diarrhea.     On arrival, Temp 101.5, , /110, RR 22, SpO2 100% on 6L NC. Labs notable for a WBC count of 13.3, Na 134, lactate 2.5->1.4. U/a negative, limited RVP + for Covid. Chest Xray showed left basilar airspace disease. CT head showed no acute intracranial hemorrhage or mass effect. CT cervical spine showed no definite acute fracture or traumatic malalignment. However, please note that fracture of the dens cannot be excluded simply due to extensive motion artifact at this location. In ED, patient was given 2L of IV fluid and 1x dose of ceftriaxone and azithromycin. Blood cultures and urine cultures obtained.  (03 Dec 2023 23:23)    Hospital Course:  Pt received one dose of CTX and azithromycin in the ED. Pt had acute metabolic encephalopathy 2/2 COVID PNA and was started on Remdesivir for 5 days and dexamethasone for 10 days. Pt's mental status continued to improve through pt's hospitalization. Pt was weaned to 2L N/C and ultimately was weaned to room air. Pt was also seen by wound care for sacral wound. One blood culture was positive for Staph epi, but culture was deemed to be a contaminant as other blood cultures were negative. Pt was evaluated by PT and was recommended [facility]. On day of discharge, pt is afebrile and medically stable to be discharged to [facility].     Important Medication Changes and Reason:      Active or Pending Issues Requiring Follow-up:  - F/u w/ PCP    Advanced Directives:   [X] Full code  [ ] DNR  [ ] Hospice    Discharge Diagnoses:  #COVID PNA  #Acute metabolic encephalopathy  #Hypertension

## 2023-12-06 NOTE — PROGRESS NOTE ADULT - PROBLEM SELECTOR PLAN 1
- AMS iso COVID   - Most like 2/2 COVID  - Day 3/5 Remdesivir 12/5  - [facility] w/o significant findings - AMS iso COVID   - Most like 2/2 COVID  - Day 3/5 Remdesivir 12/5  - [facility] w/o significant findings - AMS iso COVID   - Most like 2/2 COVID  - Day 3/5 Remdesivir 12/6  - [facility] w/o significant findings - AMS iso COVID   - Most like 2/2 COVID  - Day 3/5 Remdesivir 12/6  - [facility] w/o significant findings

## 2023-12-06 NOTE — DIETITIAN INITIAL EVALUATION ADULT - ENERGY INTAKE
Currently in-house, pt reported a good appetite and PO intake. Pt stated he has been eating ~70% of meals provided in-house. Per flow sheets, pt noted to be consuming % of meals provided in-house. RD offered pt Ensure Plus High Protein 2x/day to assist with meeting protein-energy needs, pt amenable to receiving supplement in-house. RD provided pt with a menu to assist with food preferences and optimize PO intake.  Adequate (%)

## 2023-12-06 NOTE — DISCHARGE NOTE PROVIDER - PROVIDER TOKENS
productive cough, SOB.  PROVIDER:[TOKEN:[[id number]],FOLLOWUP:[2 weeks],ESTABLISHEDPATIENT:[T]] PROVIDER:[TOKEN:[[id number]],FOLLOWUP:[2 weeks],ESTABLISHEDPATIENT:[T]]

## 2023-12-06 NOTE — PROGRESS NOTE ADULT - SUBJECTIVE AND OBJECTIVE BOX
Internal Medicine   Loraine Ro | PGY-2    OVERNIGHT EVENTS: No acute overnight events.    SUBJECTIVE:       MEDICATIONS  (STANDING):  amLODIPine   Tablet 5 milliGRAM(s) Oral daily  chlorhexidine 4% Liquid 1 Application(s) Topical daily  dexAMETHasone     Tablet 6 milliGRAM(s) Oral daily  enoxaparin Injectable 40 milliGRAM(s) SubCutaneous every 24 hours  influenza  Vaccine (HIGH DOSE) 0.7 milliLiter(s) IntraMuscular once  metoprolol succinate ER 50 milliGRAM(s) Oral two times a day  remdesivir  IVPB   IV Intermittent   remdesivir  IVPB 100 milliGRAM(s) IV Intermittent every 24 hours    MEDICATIONS  (PRN):  acetaminophen     Tablet .. 650 milliGRAM(s) Oral every 6 hours PRN Temp greater or equal to 38C (100.4F), Mild Pain (1 - 3)  aluminum hydroxide/magnesium hydroxide/simethicone Suspension 30 milliLiter(s) Oral every 4 hours PRN Dyspepsia  guaifenesin/dextromethorphan Oral Liquid 10 milliLiter(s) Oral every 4 hours PRN Cough  melatonin 3 milliGRAM(s) Oral at bedtime PRN Insomnia  ondansetron Injectable 4 milliGRAM(s) IV Push every 8 hours PRN Nausea and/or Vomiting        T(F): 98.3 (12-06-23 @ 05:26), Max: 98.9 (12-05-23 @ 14:18)  HR: 72 (12-06-23 @ 05:26) (67 - 75)  BP: 171/73 (12-06-23 @ 05:26) (134/70 - 171/73)  BP(mean): 91 (12-05-23 @ 14:18) (91 - 91)  RR: 20 (12-06-23 @ 05:26) (17 - 20)  SpO2: 96% (12-06-23 @ 05:26) (94% - 96%)    PHYSICAL EXAM:     GENERAL: NAD, lying in bed comfortably  HEAD:  Atraumatic, Normocephalic  EYES: EOMI, PERRLA, conjunctiva and sclera clear, no nystagmus noted  ENT: Moist mucous membranes,   NECK: Supple, No JVD, trachea midline  CHEST/LUNG: Clear to auscultation bilaterally; No rales, rhonchi, wheezing, or rubs. Unlabored respirations  HEART: Regular rate and rhythm; No murmurs, rubs, or gallops, normal S1/S2  ABDOMEN: normal bowel sounds; Soft, nontender, nondistended, no organomegaly   EXTREMITIES:  2+ Peripheral Pulses, brisk capillary refill. No clubbing, cyanosis, or edema  MSK: No gross deformities noted   Neurological:  A&Ox3, no focal deficits   SKIN: No rashes or lesions  PSYCH: Normal mood, affect     TELEMETRY:    LABS:                        14.9   7.36  )-----------( 248      ( 06 Dec 2023 06:21 )             45.3     12-05    140  |  100  |  20  ----------------------------<  115<H>  3.5   |  29  |  1.07    Ca    9.0      05 Dec 2023 06:34  Phos  2.9     12-05  Mg     2.1     12-05            Blood Gas Profile w/Lytes - Venous: Performed In Lab (12-06-23 @ 06:25)    Creatinine Trend: 1.07<--, 0.83<--, 0.80<--  I&O's Summary    04 Dec 2023 07:01  -  05 Dec 2023 07:00  --------------------------------------------------------  IN: 0 mL / OUT: 2000 mL / NET: -2000 mL    05 Dec 2023 07:01  -  06 Dec 2023 06:59  --------------------------------------------------------  IN: 1060 mL / OUT: 1450 mL / NET: -390 mL      BNP  Blood Gas Profile w/Lytes - Venous: Performed In Lab (12-06-23 @ 06:25)    RADIOLOGY & ADDITIONAL STUDIES:             Internal Medicine   Loraine Nataliya | PGY-2    OVERNIGHT EVENTS: No acute overnight events.    SUBJECTIVE: Patient was seen and examined at bedside this morning. Denies any nausea/vomiting/diarrhea, headache, shortness of breath, abdominal pain or chest pain/palpitations. Pt AOx2 (person, year) this AM. Vital signs/imaging/telemetry events reviewed.       MEDICATIONS  (STANDING):  amLODIPine   Tablet 5 milliGRAM(s) Oral daily  chlorhexidine 4% Liquid 1 Application(s) Topical daily  dexAMETHasone     Tablet 6 milliGRAM(s) Oral daily  enoxaparin Injectable 40 milliGRAM(s) SubCutaneous every 24 hours  influenza  Vaccine (HIGH DOSE) 0.7 milliLiter(s) IntraMuscular once  metoprolol succinate ER 50 milliGRAM(s) Oral two times a day  remdesivir  IVPB   IV Intermittent   remdesivir  IVPB 100 milliGRAM(s) IV Intermittent every 24 hours    MEDICATIONS  (PRN):  acetaminophen     Tablet .. 650 milliGRAM(s) Oral every 6 hours PRN Temp greater or equal to 38C (100.4F), Mild Pain (1 - 3)  aluminum hydroxide/magnesium hydroxide/simethicone Suspension 30 milliLiter(s) Oral every 4 hours PRN Dyspepsia  guaifenesin/dextromethorphan Oral Liquid 10 milliLiter(s) Oral every 4 hours PRN Cough  melatonin 3 milliGRAM(s) Oral at bedtime PRN Insomnia  ondansetron Injectable 4 milliGRAM(s) IV Push every 8 hours PRN Nausea and/or Vomiting        T(F): 98.3 (12-06-23 @ 05:26), Max: 98.9 (12-05-23 @ 14:18)  HR: 72 (12-06-23 @ 05:26) (67 - 75)  BP: 171/73 (12-06-23 @ 05:26) (134/70 - 171/73)  BP(mean): 91 (12-05-23 @ 14:18) (91 - 91)  RR: 20 (12-06-23 @ 05:26) (17 - 20)  SpO2: 96% (12-06-23 @ 05:26) (94% - 96%)    PHYSICAL EXAM:     GENERAL: +N/C; NAD, lying in bed comfortably  HEAD:  Atraumatic, Normocephalic  EYES: EOMI, PERRLA, conjunctiva and sclera clear, no nystagmus noted  ENT: Moist mucous membranes,   NECK: Supple, No JVD, trachea midline  CHEST/LUNG: Clear to auscultation bilaterally; No rales, rhonchi, wheezing, or rubs. Unlabored respirations  HEART: Regular rate and rhythm; No murmurs, rubs, or gallops, normal S1/S2  ABDOMEN: normal bowel sounds; Soft, nontender, nondistended, no organomegaly   EXTREMITIES:  2+ Peripheral Pulses, brisk capillary refill. No clubbing, cyanosis, or edema  MSK: No gross deformities noted   Neurological:  A&Ox2 (person, time), no focal deficits   SKIN: No rashes or lesions  PSYCH: Normal mood, affect     TELEMETRY:    LABS:                        14.9   7.36  )-----------( 248      ( 06 Dec 2023 06:21 )             45.3     12-05    140  |  100  |  20  ----------------------------<  115<H>  3.5   |  29  |  1.07    Ca    9.0      05 Dec 2023 06:34  Phos  2.9     12-05  Mg     2.1     12-05            Blood Gas Profile w/Lytes - Venous: Performed In Lab (12-06-23 @ 06:25)    Creatinine Trend: 1.07<--, 0.83<--, 0.80<--  I&O's Summary    04 Dec 2023 07:01  -  05 Dec 2023 07:00  --------------------------------------------------------  IN: 0 mL / OUT: 2000 mL / NET: -2000 mL    05 Dec 2023 07:01  -  06 Dec 2023 06:59  --------------------------------------------------------  IN: 1060 mL / OUT: 1450 mL / NET: -390 mL      BNP  Blood Gas Profile w/Lytes - Venous: Performed In Lab (12-06-23 @ 06:25)    RADIOLOGY & ADDITIONAL STUDIES:

## 2023-12-07 LAB
ANION GAP SERPL CALC-SCNC: 13 MMOL/L — SIGNIFICANT CHANGE UP (ref 5–17)
ANION GAP SERPL CALC-SCNC: 13 MMOL/L — SIGNIFICANT CHANGE UP (ref 5–17)
BUN SERPL-MCNC: 23 MG/DL — SIGNIFICANT CHANGE UP (ref 7–23)
BUN SERPL-MCNC: 23 MG/DL — SIGNIFICANT CHANGE UP (ref 7–23)
CALCIUM SERPL-MCNC: 9 MG/DL — SIGNIFICANT CHANGE UP (ref 8.4–10.5)
CALCIUM SERPL-MCNC: 9 MG/DL — SIGNIFICANT CHANGE UP (ref 8.4–10.5)
CHLORIDE SERPL-SCNC: 104 MMOL/L — SIGNIFICANT CHANGE UP (ref 96–108)
CHLORIDE SERPL-SCNC: 104 MMOL/L — SIGNIFICANT CHANGE UP (ref 96–108)
CO2 SERPL-SCNC: 27 MMOL/L — SIGNIFICANT CHANGE UP (ref 22–31)
CO2 SERPL-SCNC: 27 MMOL/L — SIGNIFICANT CHANGE UP (ref 22–31)
CREAT SERPL-MCNC: 0.79 MG/DL — SIGNIFICANT CHANGE UP (ref 0.5–1.3)
CREAT SERPL-MCNC: 0.79 MG/DL — SIGNIFICANT CHANGE UP (ref 0.5–1.3)
CULTURE RESULTS: ABNORMAL
EGFR: 87 ML/MIN/1.73M2 — SIGNIFICANT CHANGE UP
EGFR: 87 ML/MIN/1.73M2 — SIGNIFICANT CHANGE UP
GLUCOSE SERPL-MCNC: 148 MG/DL — HIGH (ref 70–99)
GLUCOSE SERPL-MCNC: 148 MG/DL — HIGH (ref 70–99)
GRAM STN FLD: ABNORMAL
GRAM STN FLD: ABNORMAL
HCT VFR BLD CALC: 45.9 % — SIGNIFICANT CHANGE UP (ref 39–50)
HCT VFR BLD CALC: 45.9 % — SIGNIFICANT CHANGE UP (ref 39–50)
HGB BLD-MCNC: 15.2 G/DL — SIGNIFICANT CHANGE UP (ref 13–17)
HGB BLD-MCNC: 15.2 G/DL — SIGNIFICANT CHANGE UP (ref 13–17)
MAGNESIUM SERPL-MCNC: 2.1 MG/DL — SIGNIFICANT CHANGE UP (ref 1.6–2.6)
MAGNESIUM SERPL-MCNC: 2.1 MG/DL — SIGNIFICANT CHANGE UP (ref 1.6–2.6)
MCHC RBC-ENTMCNC: 29.6 PG — SIGNIFICANT CHANGE UP (ref 27–34)
MCHC RBC-ENTMCNC: 29.6 PG — SIGNIFICANT CHANGE UP (ref 27–34)
MCHC RBC-ENTMCNC: 33.1 GM/DL — SIGNIFICANT CHANGE UP (ref 32–36)
MCHC RBC-ENTMCNC: 33.1 GM/DL — SIGNIFICANT CHANGE UP (ref 32–36)
MCV RBC AUTO: 89.3 FL — SIGNIFICANT CHANGE UP (ref 80–100)
MCV RBC AUTO: 89.3 FL — SIGNIFICANT CHANGE UP (ref 80–100)
NRBC # BLD: 0 /100 WBCS — SIGNIFICANT CHANGE UP (ref 0–0)
NRBC # BLD: 0 /100 WBCS — SIGNIFICANT CHANGE UP (ref 0–0)
ORGANISM # SPEC MICROSCOPIC CNT: ABNORMAL
ORGANISM # SPEC MICROSCOPIC CNT: ABNORMAL
PHOSPHATE SERPL-MCNC: 3.5 MG/DL — SIGNIFICANT CHANGE UP (ref 2.5–4.5)
PHOSPHATE SERPL-MCNC: 3.5 MG/DL — SIGNIFICANT CHANGE UP (ref 2.5–4.5)
PLATELET # BLD AUTO: 322 K/UL — SIGNIFICANT CHANGE UP (ref 150–400)
PLATELET # BLD AUTO: 322 K/UL — SIGNIFICANT CHANGE UP (ref 150–400)
POTASSIUM SERPL-MCNC: 3.7 MMOL/L — SIGNIFICANT CHANGE UP (ref 3.5–5.3)
POTASSIUM SERPL-MCNC: 3.7 MMOL/L — SIGNIFICANT CHANGE UP (ref 3.5–5.3)
POTASSIUM SERPL-SCNC: 3.7 MMOL/L — SIGNIFICANT CHANGE UP (ref 3.5–5.3)
POTASSIUM SERPL-SCNC: 3.7 MMOL/L — SIGNIFICANT CHANGE UP (ref 3.5–5.3)
RBC # BLD: 5.14 M/UL — SIGNIFICANT CHANGE UP (ref 4.2–5.8)
RBC # BLD: 5.14 M/UL — SIGNIFICANT CHANGE UP (ref 4.2–5.8)
RBC # FLD: 14 % — SIGNIFICANT CHANGE UP (ref 10.3–14.5)
RBC # FLD: 14 % — SIGNIFICANT CHANGE UP (ref 10.3–14.5)
SODIUM SERPL-SCNC: 144 MMOL/L — SIGNIFICANT CHANGE UP (ref 135–145)
SODIUM SERPL-SCNC: 144 MMOL/L — SIGNIFICANT CHANGE UP (ref 135–145)
WBC # BLD: 9.61 K/UL — SIGNIFICANT CHANGE UP (ref 3.8–10.5)
WBC # BLD: 9.61 K/UL — SIGNIFICANT CHANGE UP (ref 3.8–10.5)
WBC # FLD AUTO: 9.61 K/UL — SIGNIFICANT CHANGE UP (ref 3.8–10.5)
WBC # FLD AUTO: 9.61 K/UL — SIGNIFICANT CHANGE UP (ref 3.8–10.5)

## 2023-12-07 PROCEDURE — 99233 SBSQ HOSP IP/OBS HIGH 50: CPT | Mod: GC

## 2023-12-07 RX ORDER — AMLODIPINE BESYLATE 2.5 MG/1
10 TABLET ORAL DAILY
Refills: 0 | Status: DISCONTINUED | OUTPATIENT
Start: 2023-12-07 | End: 2023-12-08

## 2023-12-07 RX ADMIN — Medication 50 MILLIGRAM(S): at 18:07

## 2023-12-07 RX ADMIN — REMDESIVIR 200 MILLIGRAM(S): 5 INJECTION INTRAVENOUS at 11:41

## 2023-12-07 RX ADMIN — CHLORHEXIDINE GLUCONATE 1 APPLICATION(S): 213 SOLUTION TOPICAL at 13:38

## 2023-12-07 RX ADMIN — ENOXAPARIN SODIUM 40 MILLIGRAM(S): 100 INJECTION SUBCUTANEOUS at 07:00

## 2023-12-07 RX ADMIN — POLYETHYLENE GLYCOL 3350 17 GRAM(S): 17 POWDER, FOR SOLUTION ORAL at 13:37

## 2023-12-07 RX ADMIN — Medication 1 TABLET(S): at 13:37

## 2023-12-07 RX ADMIN — SENNA PLUS 2 TABLET(S): 8.6 TABLET ORAL at 22:07

## 2023-12-07 RX ADMIN — AMLODIPINE BESYLATE 5 MILLIGRAM(S): 2.5 TABLET ORAL at 07:00

## 2023-12-07 RX ADMIN — Medication 50 MILLIGRAM(S): at 07:00

## 2023-12-07 RX ADMIN — Medication 6 MILLIGRAM(S): at 07:00

## 2023-12-07 RX ADMIN — Medication 500 MILLIGRAM(S): at 13:37

## 2023-12-07 NOTE — PROGRESS NOTE ADULT - ATTENDING COMMENTS
83M w/ PMHx significant for HTN and BPH s/p TURP p/w fall found to have sepsis and acute hypoxemic respiratory failure likely 2/2 to COVID PNA and acute metabolic encephelopathy      # Acute hypoxemic respiratory failure 2/2 to COVID 19 PNA  # Sepsis   # Acute metabolic encephalopathy  # Hypertension    - CTH no acute pathology; AMS Now resolved and back to baseline   - BCX NGTD  - norvasc increased from 5mg home dose to 10mg ad BPs remain uncontrolled  -c/w IV Remdisivirx 5 days can dc dex today- patient restless; cannot sleep too well  -Isolation precautions  -Supportive care  -DVT PPx, Lovenox  -PT rec JUANITA; fam in agreement    likely dc friday after completion of remdisivir .

## 2023-12-07 NOTE — PROGRESS NOTE ADULT - PROBLEM SELECTOR PLAN 5
DVT ppx: lovenox   Diet: DASH/TLC   Dispo: medically active DVT ppx: lovenox   Diet: DASH/TLC   Dispo: Pending JUANITA

## 2023-12-07 NOTE — PROGRESS NOTE ADULT - PROBLEM SELECTOR PLAN 3
- patient reported slipped on plastic bag but was feeling weak prior to mechanical fall 2/2 infectious process  - no indication for syncopal workup at this time   - CT Head negative for acute process   - CT cervical spine showed no definite acute fracture or traumatic malalignment  - PT consulted. - patient reported slipped on plastic bag but was feeling weak prior to mechanical fall 2/2 infectious process  - no indication for syncopal workup at this time   - CT Head negative for acute process   - CT cervical spine showed no definite acute fracture or traumatic malalignment  - PT consulted- recommends JUANITA

## 2023-12-07 NOTE — PROGRESS NOTE ADULT - PROBLEM SELECTOR PLAN 1
- AMS iso COVID   - Most like 2/2 COVID  - Day 3/5 Remdesivir 12/6  - [facility] w/o significant findings - AMS iso COVID   - Most like 2/2 COVID  - Day 3/5 Remdesivir 12/6  - [facility] w/o significant findings - AMS iso COVID   - Most like 2/2 COVID  - Day 4/5 Remdesivir and dexamethasone  - CTH w/o significant findings

## 2023-12-07 NOTE — PROGRESS NOTE ADULT - SUBJECTIVE AND OBJECTIVE BOX
Internal Medicine   Loraine An | PGY-2    OVERNIGHT EVENTS: No acute overnight events.    SUBJECTIVE:       MEDICATIONS  (STANDING):  amLODIPine   Tablet 5 milliGRAM(s) Oral daily  ascorbic acid 500 milliGRAM(s) Oral daily  chlorhexidine 4% Liquid 1 Application(s) Topical daily  dexAMETHasone     Tablet 6 milliGRAM(s) Oral daily  enoxaparin Injectable 40 milliGRAM(s) SubCutaneous every 24 hours  influenza  Vaccine (HIGH DOSE) 0.7 milliLiter(s) IntraMuscular once  metoprolol succinate ER 50 milliGRAM(s) Oral two times a day  multivitamin 1 Tablet(s) Oral daily  polyethylene glycol 3350 17 Gram(s) Oral daily  remdesivir  IVPB 100 milliGRAM(s) IV Intermittent every 24 hours  remdesivir  IVPB   IV Intermittent   senna 2 Tablet(s) Oral at bedtime    MEDICATIONS  (PRN):  acetaminophen     Tablet .. 650 milliGRAM(s) Oral every 6 hours PRN Temp greater or equal to 38C (100.4F), Mild Pain (1 - 3)  aluminum hydroxide/magnesium hydroxide/simethicone Suspension 30 milliLiter(s) Oral every 4 hours PRN Dyspepsia  guaifenesin/dextromethorphan Oral Liquid 10 milliLiter(s) Oral every 4 hours PRN Cough  melatonin 3 milliGRAM(s) Oral at bedtime PRN Insomnia  ondansetron Injectable 4 milliGRAM(s) IV Push every 8 hours PRN Nausea and/or Vomiting        T(F): 98.5 (12-07-23 @ 04:00), Max: 98.5 (12-07-23 @ 04:00)  HR: 67 (12-07-23 @ 04:00) (64 - 122)  BP: 163/88 (12-07-23 @ 04:00) (145/81 - 173/75)  BP(mean): --  RR: 18 (12-07-23 @ 04:00) (18 - 18)  SpO2: 95% (12-07-23 @ 04:00) (92% - 96%)    PHYSICAL EXAM:     GENERAL: NAD, lying in bed comfortably  HEAD:  Atraumatic, Normocephalic  EYES: EOMI, PERRLA, conjunctiva and sclera clear, no nystagmus noted  ENT: Moist mucous membranes,   NECK: Supple, No JVD, trachea midline  CHEST/LUNG: Clear to auscultation bilaterally; No rales, rhonchi, wheezing, or rubs. Unlabored respirations  HEART: Regular rate and rhythm; No murmurs, rubs, or gallops, normal S1/S2  ABDOMEN: normal bowel sounds; Soft, nontender, nondistended, no organomegaly   EXTREMITIES:  2+ Peripheral Pulses, brisk capillary refill. No clubbing, cyanosis, or edema  MSK: No gross deformities noted   Neurological:  A&Ox3, no focal deficits   SKIN: No rashes or lesions  PSYCH: Normal mood, affect     TELEMETRY:    LABS:                        14.9   7.36  )-----------( 248      ( 06 Dec 2023 06:21 )             45.3     12-06    142  |  102  |  22  ----------------------------<  130<H>  3.3<L>   |  28  |  0.92    Ca    8.9      06 Dec 2023 06:20  Phos  3.4     12-06  Mg     2.1     12-06              Creatinine Trend: 0.92<--, 1.07<--, 0.83<--, 0.80<--  I&O's Summary    06 Dec 2023 07:01  -  07 Dec 2023 07:00  --------------------------------------------------------  IN: 460 mL / OUT: 1600 mL / NET: -1140 mL      BNP    RADIOLOGY & ADDITIONAL STUDIES:             Internal Medicine   Loraine Nataliya | PGY-2    OVERNIGHT EVENTS: No acute overnight events.    SUBJECTIVE: Patient was seen and examined at bedside this morning. Denies any nausea/vomiting/diarrhea, headache, shortness of breath, abdominal pain or chest pain/palpitations. Pt AOx2 this AM (person, time). Pt denies any acute complaints this AM. Vital signs/imaging/telemetry events reviewed.       MEDICATIONS  (STANDING):  amLODIPine   Tablet 5 milliGRAM(s) Oral daily  ascorbic acid 500 milliGRAM(s) Oral daily  chlorhexidine 4% Liquid 1 Application(s) Topical daily  dexAMETHasone     Tablet 6 milliGRAM(s) Oral daily  enoxaparin Injectable 40 milliGRAM(s) SubCutaneous every 24 hours  influenza  Vaccine (HIGH DOSE) 0.7 milliLiter(s) IntraMuscular once  metoprolol succinate ER 50 milliGRAM(s) Oral two times a day  multivitamin 1 Tablet(s) Oral daily  polyethylene glycol 3350 17 Gram(s) Oral daily  remdesivir  IVPB 100 milliGRAM(s) IV Intermittent every 24 hours  remdesivir  IVPB   IV Intermittent   senna 2 Tablet(s) Oral at bedtime    MEDICATIONS  (PRN):  acetaminophen     Tablet .. 650 milliGRAM(s) Oral every 6 hours PRN Temp greater or equal to 38C (100.4F), Mild Pain (1 - 3)  aluminum hydroxide/magnesium hydroxide/simethicone Suspension 30 milliLiter(s) Oral every 4 hours PRN Dyspepsia  guaifenesin/dextromethorphan Oral Liquid 10 milliLiter(s) Oral every 4 hours PRN Cough  melatonin 3 milliGRAM(s) Oral at bedtime PRN Insomnia  ondansetron Injectable 4 milliGRAM(s) IV Push every 8 hours PRN Nausea and/or Vomiting        T(F): 98.5 (12-07-23 @ 04:00), Max: 98.5 (12-07-23 @ 04:00)  HR: 67 (12-07-23 @ 04:00) (64 - 122)  BP: 163/88 (12-07-23 @ 04:00) (145/81 - 173/75)  BP(mean): --  RR: 18 (12-07-23 @ 04:00) (18 - 18)  SpO2: 95% (12-07-23 @ 04:00) (92% - 96%)    PHYSICAL EXAM:     GENERAL: +NC; NAD, lying in bed comfortably  HEAD:  Atraumatic, Normocephalic  EYES: EOMI, PERRLA, conjunctiva and sclera clear, no nystagmus noted  ENT: Moist mucous membranes,   NECK: Supple, No JVD, trachea midline  CHEST/LUNG: Clear to auscultation bilaterally; No rales, rhonchi, wheezing, or rubs. Unlabored respirations  HEART: Regular rate and rhythm; No murmurs, rubs, or gallops, normal S1/S2  ABDOMEN: normal bowel sounds; Soft, nontender, nondistended, no organomegaly   EXTREMITIES:  2+ Peripheral Pulses, brisk capillary refill. No clubbing, cyanosis, or edema  MSK: No gross deformities noted   Neurological:  A&Ox3, no focal deficits   SKIN: No rashes or lesions  PSYCH: Normal mood, affect     TELEMETRY:    LABS:                        14.9   7.36  )-----------( 248      ( 06 Dec 2023 06:21 )             45.3     12-06    142  |  102  |  22  ----------------------------<  130<H>  3.3<L>   |  28  |  0.92    Ca    8.9      06 Dec 2023 06:20  Phos  3.4     12-06  Mg     2.1     12-06              Creatinine Trend: 0.92<--, 1.07<--, 0.83<--, 0.80<--  I&O's Summary    06 Dec 2023 07:01  -  07 Dec 2023 07:00  --------------------------------------------------------  IN: 460 mL / OUT: 1600 mL / NET: -1140 mL      BNP    RADIOLOGY & ADDITIONAL STUDIES:

## 2023-12-08 ENCOUNTER — TRANSCRIPTION ENCOUNTER (OUTPATIENT)
Age: 83
End: 2023-12-08

## 2023-12-08 VITALS
HEART RATE: 63 BPM | DIASTOLIC BLOOD PRESSURE: 77 MMHG | SYSTOLIC BLOOD PRESSURE: 154 MMHG | TEMPERATURE: 97 F | RESPIRATION RATE: 18 BRPM | OXYGEN SATURATION: 93 %

## 2023-12-08 PROCEDURE — 87449 NOS EACH ORGANISM AG IA: CPT

## 2023-12-08 PROCEDURE — 80053 COMPREHEN METABOLIC PANEL: CPT

## 2023-12-08 PROCEDURE — 85610 PROTHROMBIN TIME: CPT

## 2023-12-08 PROCEDURE — 84132 ASSAY OF SERUM POTASSIUM: CPT

## 2023-12-08 PROCEDURE — G1004: CPT

## 2023-12-08 PROCEDURE — 72125 CT NECK SPINE W/O DYE: CPT | Mod: MG

## 2023-12-08 PROCEDURE — 36415 COLL VENOUS BLD VENIPUNCTURE: CPT

## 2023-12-08 PROCEDURE — 83880 ASSAY OF NATRIURETIC PEPTIDE: CPT

## 2023-12-08 PROCEDURE — 82947 ASSAY GLUCOSE BLOOD QUANT: CPT

## 2023-12-08 PROCEDURE — 90662 IIV NO PRSV INCREASED AG IM: CPT

## 2023-12-08 PROCEDURE — 84145 PROCALCITONIN (PCT): CPT

## 2023-12-08 PROCEDURE — 87040 BLOOD CULTURE FOR BACTERIA: CPT

## 2023-12-08 PROCEDURE — 82803 BLOOD GASES ANY COMBINATION: CPT

## 2023-12-08 PROCEDURE — 71045 X-RAY EXAM CHEST 1 VIEW: CPT

## 2023-12-08 PROCEDURE — 96375 TX/PRO/DX INJ NEW DRUG ADDON: CPT

## 2023-12-08 PROCEDURE — 83605 ASSAY OF LACTIC ACID: CPT

## 2023-12-08 PROCEDURE — 97161 PT EVAL LOW COMPLEX 20 MIN: CPT

## 2023-12-08 PROCEDURE — 87086 URINE CULTURE/COLONY COUNT: CPT

## 2023-12-08 PROCEDURE — 80061 LIPID PANEL: CPT

## 2023-12-08 PROCEDURE — 84295 ASSAY OF SERUM SODIUM: CPT

## 2023-12-08 PROCEDURE — 84100 ASSAY OF PHOSPHORUS: CPT

## 2023-12-08 PROCEDURE — 85018 HEMOGLOBIN: CPT

## 2023-12-08 PROCEDURE — 83036 HEMOGLOBIN GLYCOSYLATED A1C: CPT

## 2023-12-08 PROCEDURE — 84484 ASSAY OF TROPONIN QUANT: CPT

## 2023-12-08 PROCEDURE — 80048 BASIC METABOLIC PNL TOTAL CA: CPT

## 2023-12-08 PROCEDURE — 85730 THROMBOPLASTIN TIME PARTIAL: CPT

## 2023-12-08 PROCEDURE — 85014 HEMATOCRIT: CPT

## 2023-12-08 PROCEDURE — 82435 ASSAY OF BLOOD CHLORIDE: CPT

## 2023-12-08 PROCEDURE — 70450 CT HEAD/BRAIN W/O DYE: CPT | Mod: MG

## 2023-12-08 PROCEDURE — 85025 COMPLETE CBC W/AUTO DIFF WBC: CPT

## 2023-12-08 PROCEDURE — 99285 EMERGENCY DEPT VISIT HI MDM: CPT

## 2023-12-08 PROCEDURE — 99239 HOSP IP/OBS DSCHRG MGMT >30: CPT

## 2023-12-08 PROCEDURE — 83735 ASSAY OF MAGNESIUM: CPT

## 2023-12-08 PROCEDURE — 87641 MR-STAPH DNA AMP PROBE: CPT

## 2023-12-08 PROCEDURE — 87077 CULTURE AEROBIC IDENTIFY: CPT

## 2023-12-08 PROCEDURE — 82330 ASSAY OF CALCIUM: CPT

## 2023-12-08 PROCEDURE — 87637 SARSCOV2&INF A&B&RSV AMP PRB: CPT

## 2023-12-08 PROCEDURE — 85027 COMPLETE CBC AUTOMATED: CPT

## 2023-12-08 PROCEDURE — 96374 THER/PROPH/DIAG INJ IV PUSH: CPT

## 2023-12-08 PROCEDURE — 87640 STAPH A DNA AMP PROBE: CPT

## 2023-12-08 PROCEDURE — 87899 AGENT NOS ASSAY W/OPTIC: CPT

## 2023-12-08 PROCEDURE — 87150 DNA/RNA AMPLIFIED PROBE: CPT

## 2023-12-08 PROCEDURE — 81001 URINALYSIS AUTO W/SCOPE: CPT

## 2023-12-08 RX ORDER — AMLODIPINE BESYLATE 2.5 MG/1
1 TABLET ORAL
Qty: 0 | Refills: 0 | DISCHARGE
Start: 2023-12-08

## 2023-12-08 RX ORDER — AMLODIPINE BESYLATE 2.5 MG/1
1 TABLET ORAL
Refills: 0 | DISCHARGE

## 2023-12-08 RX ORDER — ASCORBIC ACID 60 MG
1 TABLET,CHEWABLE ORAL
Qty: 0 | Refills: 0 | DISCHARGE
Start: 2023-12-08

## 2023-12-08 RX ADMIN — INFLUENZA VIRUS VACCINE 0.7 MILLILITER(S): 15; 15; 15; 15 SUSPENSION INTRAMUSCULAR at 12:28

## 2023-12-08 RX ADMIN — REMDESIVIR 200 MILLIGRAM(S): 5 INJECTION INTRAVENOUS at 10:44

## 2023-12-08 RX ADMIN — POLYETHYLENE GLYCOL 3350 17 GRAM(S): 17 POWDER, FOR SOLUTION ORAL at 12:28

## 2023-12-08 RX ADMIN — AMLODIPINE BESYLATE 10 MILLIGRAM(S): 2.5 TABLET ORAL at 12:27

## 2023-12-08 RX ADMIN — Medication 1 TABLET(S): at 12:28

## 2023-12-08 RX ADMIN — Medication 50 MILLIGRAM(S): at 12:27

## 2023-12-08 RX ADMIN — Medication 500 MILLIGRAM(S): at 12:28

## 2023-12-08 RX ADMIN — ENOXAPARIN SODIUM 40 MILLIGRAM(S): 100 INJECTION SUBCUTANEOUS at 05:44

## 2023-12-08 NOTE — DISCHARGE NOTE NURSING/CASE MANAGEMENT/SOCIAL WORK - PATIENT PORTAL LINK FT
You can access the FollowMyHealth Patient Portal offered by [facility] by registering at the following website: [email or web address]. By joining [employer]’s FollowMyHealth portal, you will also be able to view your health information using other applications (apps) compatible with our system. You can access the FollowMyHealth Patient Portal offered by [facility] by registering at the following website: [email or web address]. By joining [employer]’s FollowMyHealth portal, you will also be able to view your health information using other applications (apps) compatible with our system.

## 2023-12-08 NOTE — PROGRESS NOTE ADULT - PROBLEM SELECTOR PLAN 1
- AMS iso COVID   - Most like 2/2 COVID  - Day 4/5 Remdesivir and dexamethasone  - CTH w/o significant findings

## 2023-12-08 NOTE — PROGRESS NOTE ADULT - SUBJECTIVE AND OBJECTIVE BOX
Internal Medicine   Loraine An | PGY-2    OVERNIGHT EVENTS: No acute overnight events.    SUBJECTIVE:       MEDICATIONS  (STANDING):  amLODIPine   Tablet 10 milliGRAM(s) Oral daily  ascorbic acid 500 milliGRAM(s) Oral daily  chlorhexidine 4% Liquid 1 Application(s) Topical daily  enoxaparin Injectable 40 milliGRAM(s) SubCutaneous every 24 hours  influenza  Vaccine (HIGH DOSE) 0.7 milliLiter(s) IntraMuscular once  metoprolol succinate ER 50 milliGRAM(s) Oral two times a day  multivitamin 1 Tablet(s) Oral daily  polyethylene glycol 3350 17 Gram(s) Oral daily  remdesivir  IVPB 100 milliGRAM(s) IV Intermittent every 24 hours  remdesivir  IVPB   IV Intermittent   senna 2 Tablet(s) Oral at bedtime    MEDICATIONS  (PRN):  acetaminophen     Tablet .. 650 milliGRAM(s) Oral every 6 hours PRN Temp greater or equal to 38C (100.4F), Mild Pain (1 - 3)  aluminum hydroxide/magnesium hydroxide/simethicone Suspension 30 milliLiter(s) Oral every 4 hours PRN Dyspepsia  guaifenesin/dextromethorphan Oral Liquid 10 milliLiter(s) Oral every 4 hours PRN Cough  melatonin 3 milliGRAM(s) Oral at bedtime PRN Insomnia  ondansetron Injectable 4 milliGRAM(s) IV Push every 8 hours PRN Nausea and/or Vomiting        T(F): 97.7 (12-08-23 @ 04:00), Max: 98.7 (12-07-23 @ 18:01)  HR: 50 (12-08-23 @ 04:00) (50 - 68)  BP: 136/70 (12-08-23 @ 04:00) (136/70 - 156/73)  BP(mean): --  RR: 18 (12-08-23 @ 04:00) (18 - 18)  SpO2: 94% (12-08-23 @ 04:00) (94% - 96%)    PHYSICAL EXAM:     GENERAL: NAD, lying in bed comfortably  HEAD:  Atraumatic, Normocephalic  EYES: EOMI, PERRLA, conjunctiva and sclera clear, no nystagmus noted  ENT: Moist mucous membranes,   NECK: Supple, No JVD, trachea midline  CHEST/LUNG: Clear to auscultation bilaterally; No rales, rhonchi, wheezing, or rubs. Unlabored respirations  HEART: Regular rate and rhythm; No murmurs, rubs, or gallops, normal S1/S2  ABDOMEN: normal bowel sounds; Soft, nontender, nondistended, no organomegaly   EXTREMITIES:  2+ Peripheral Pulses, brisk capillary refill. No clubbing, cyanosis, or edema  MSK: No gross deformities noted   Neurological:  A&Ox3, no focal deficits   SKIN: No rashes or lesions  PSYCH: Normal mood, affect     TELEMETRY:    LABS:                        15.2   9.61  )-----------( 322      ( 07 Dec 2023 06:49 )             45.9     12-07    144  |  104  |  23  ----------------------------<  148<H>  3.7   |  27  |  0.79    Ca    9.0      07 Dec 2023 06:49  Phos  3.5     12-07  Mg     2.1     12-07              Creatinine Trend: 0.79<--, 0.92<--, 1.07<--, 0.83<--, 0.80<--  I&O's Summary    06 Dec 2023 07:01  -  07 Dec 2023 07:00  --------------------------------------------------------  IN: 460 mL / OUT: 1600 mL / NET: -1140 mL      BNP    RADIOLOGY & ADDITIONAL STUDIES:             Internal Medicine   Loraine Nataliya | PGY-2    OVERNIGHT EVENTS: No acute overnight events.    SUBJECTIVE: Patient was seen and examined at bedside this morning. Denies any nausea/vomiting/diarrhea, headache, shortness of breath, abdominal pain or chest pain/palpitations. Patient responding appropriately to questions and able to make needs known. Vital signs/imaging/telemetry events reviewed.       MEDICATIONS  (STANDING):  amLODIPine   Tablet 10 milliGRAM(s) Oral daily  ascorbic acid 500 milliGRAM(s) Oral daily  chlorhexidine 4% Liquid 1 Application(s) Topical daily  enoxaparin Injectable 40 milliGRAM(s) SubCutaneous every 24 hours  influenza  Vaccine (HIGH DOSE) 0.7 milliLiter(s) IntraMuscular once  metoprolol succinate ER 50 milliGRAM(s) Oral two times a day  multivitamin 1 Tablet(s) Oral daily  polyethylene glycol 3350 17 Gram(s) Oral daily  remdesivir  IVPB 100 milliGRAM(s) IV Intermittent every 24 hours  remdesivir  IVPB   IV Intermittent   senna 2 Tablet(s) Oral at bedtime    MEDICATIONS  (PRN):  acetaminophen     Tablet .. 650 milliGRAM(s) Oral every 6 hours PRN Temp greater or equal to 38C (100.4F), Mild Pain (1 - 3)  aluminum hydroxide/magnesium hydroxide/simethicone Suspension 30 milliLiter(s) Oral every 4 hours PRN Dyspepsia  guaifenesin/dextromethorphan Oral Liquid 10 milliLiter(s) Oral every 4 hours PRN Cough  melatonin 3 milliGRAM(s) Oral at bedtime PRN Insomnia  ondansetron Injectable 4 milliGRAM(s) IV Push every 8 hours PRN Nausea and/or Vomiting        T(F): 97.7 (12-08-23 @ 04:00), Max: 98.7 (12-07-23 @ 18:01)  HR: 50 (12-08-23 @ 04:00) (50 - 68)  BP: 136/70 (12-08-23 @ 04:00) (136/70 - 156/73)  BP(mean): --  RR: 18 (12-08-23 @ 04:00) (18 - 18)  SpO2: 94% (12-08-23 @ 04:00) (94% - 96%)    PHYSICAL EXAM:     GENERAL: NAD, lying in bed comfortably  HEAD:  Atraumatic, Normocephalic  EYES: EOMI, PERRLA, conjunctiva and sclera clear, no nystagmus noted  ENT: Moist mucous membranes,   NECK: Supple, No JVD, trachea midline  CHEST/LUNG: Clear to auscultation bilaterally; No rales, rhonchi, wheezing, or rubs. Unlabored respirations  HEART: Regular rate and rhythm; No murmurs, rubs, or gallops, normal S1/S2  ABDOMEN: normal bowel sounds; Soft, nontender, nondistended, no organomegaly   EXTREMITIES:  2+ Peripheral Pulses, brisk capillary refill. No clubbing, cyanosis, or edema  MSK: No gross deformities noted   Neurological:  A&Ox3 w/ mild confusion at times, no focal deficits   SKIN: No rashes or lesions  PSYCH: Normal mood, affect     TELEMETRY:    LABS:                        15.2   9.61  )-----------( 322      ( 07 Dec 2023 06:49 )             45.9     12-07    144  |  104  |  23  ----------------------------<  148<H>  3.7   |  27  |  0.79    Ca    9.0      07 Dec 2023 06:49  Phos  3.5     12-07  Mg     2.1     12-07              Creatinine Trend: 0.79<--, 0.92<--, 1.07<--, 0.83<--, 0.80<--  I&O's Summary    06 Dec 2023 07:01  -  07 Dec 2023 07:00  --------------------------------------------------------  IN: 460 mL / OUT: 1600 mL / NET: -1140 mL      BNP    RADIOLOGY & ADDITIONAL STUDIES:

## 2023-12-08 NOTE — PROGRESS NOTE ADULT - PROBLEM SELECTOR PLAN 5
DVT ppx: lovenox   Diet: DASH/TLC   Dispo: Pending JUANITA DVT ppx: lovenox   Diet: DASH/TLC   Dispo: Pending [facility]

## 2023-12-08 NOTE — PROGRESS NOTE ADULT - PROBLEM SELECTOR PLAN 3
- patient reported slipped on plastic bag but was feeling weak prior to mechanical fall 2/2 infectious process  - no indication for syncopal workup at this time   - CT Head negative for acute process   - CT cervical spine showed no definite acute fracture or traumatic malalignment  - PT consulted- recommends JUANITA

## 2023-12-08 NOTE — DISCHARGE NOTE NURSING/CASE MANAGEMENT/SOCIAL WORK - NSDCPEFALRISK_GEN_ALL_CORE
For information on Fall & Injury Prevention, visit: [email or web address] OR  [email or web address] OR  https://www.cdc.gov/steadi/patient.html For information on Fall & Injury Prevention, visit: [email or web address] OR  [email or web address] OR  https://www.cdc.gov/steadi/patient.html

## 2023-12-08 NOTE — PROGRESS NOTE ADULT - PROBLEM SELECTOR PLAN 2
chest xray showing left basilar airspace disease, c/f pneumonia  - DC ceftriaxone and azithromycin for community acquired PNA  - urine legionella and strep pneumoniae neg  - symptomatic care for COVID  - C/w remdesivir 5 day course  - blood cx NGTD 12/3/23, ucx 3/23 Neg  - MRSA PCR neg  - trend WBC and fever curve  - pro-BNP, trop, and procal WNL chest xray showing left basilar airspace disease, c/f pneumonia  - DC ceftriaxone and azithromycin for community acquired PNA  - urine legionella and strep pneumoniae neg  - symptomatic care for COVID  - C/w remdesivir 5 day course- Day 5/5 12/8/23  - blood cx NGTD 12/3/23, ucx 3/23 Neg  - MRSA PCR neg  - trend WBC and fever curve  - pro-BNP, trop, and procal WNL

## 2023-12-08 NOTE — DISCHARGE NOTE NURSING/CASE MANAGEMENT/SOCIAL WORK - NSDCVIVACCINE_GEN_ALL_CORE_FT
No Vaccines Administered. influenza, high-dose, quadrivalent; 08-Dec-2023 12:28; Tasha Jaramillo ([place]); [employer]; [id number] (Exp. Date: 01-Jun-2024); IntraMuscular; Deltoid Left.; 0.7 milliLiter(s); VIS (VIS Published: 06-Aug-2021, VIS Presented: 08-Dec-2023);    influenza, high-dose, quadrivalent; 08-Dec-2023 12:28; Tasha Jaramillo ([place]); [employer]; [id number] (Exp. Date: 01-Jun-2024); IntraMuscular; Deltoid Left.; 0.7 milliLiter(s); VIS (VIS Published: 06-Aug-2021, VIS Presented: 08-Dec-2023);

## 2023-12-08 NOTE — DISCHARGE NOTE NURSING/CASE MANAGEMENT/SOCIAL WORK - NSDCFUADDAPPT_GEN_ALL_CORE_FT
APPTS ARE READY TO BE MADE: [ ] YES    Best Family or Patient Contact (if needed):  Arcadio Jttamar (patient) [phone]    Additional Information about above appointments (if needed):    1: Please schedule a follow-up appointment with your primary care doctor within 2 weeks of being discharged from the hospital.   2:   3:     Other comments or requests:    APPTS ARE READY TO BE MADE: [ ] YES    Best Family or Patient Contact (if needed):  Arcdaio Jttamar (patient) [phone]    Additional Information about above appointments (if needed):    1: Please schedule a follow-up appointment with your primary care doctor within 2 weeks of being discharged from the hospital.   2:   3:     Other comments or requests:

## 2023-12-11 LAB
CULTURE RESULTS: SIGNIFICANT CHANGE UP
SPECIMEN SOURCE: SIGNIFICANT CHANGE UP

## 2024-03-06 ENCOUNTER — NON-APPOINTMENT (OUTPATIENT)
Age: 84
End: 2024-03-06

## 2024-03-06 ENCOUNTER — APPOINTMENT (OUTPATIENT)
Dept: CARDIOLOGY | Facility: CLINIC | Age: 84
End: 2024-03-06
Payer: MEDICARE

## 2024-03-06 ENCOUNTER — APPOINTMENT (OUTPATIENT)
Dept: HEART AND VASCULAR | Facility: CLINIC | Age: 84
End: 2024-03-06
Payer: MEDICARE

## 2024-03-06 VITALS — DIASTOLIC BLOOD PRESSURE: 82 MMHG | SYSTOLIC BLOOD PRESSURE: 185 MMHG

## 2024-03-06 VITALS
WEIGHT: 193 LBS | OXYGEN SATURATION: 97 % | BODY MASS INDEX: 30.29 KG/M2 | HEIGHT: 67 IN | HEART RATE: 77 BPM | TEMPERATURE: 97.7 F | DIASTOLIC BLOOD PRESSURE: 87 MMHG | SYSTOLIC BLOOD PRESSURE: 193 MMHG

## 2024-03-06 VITALS — SYSTOLIC BLOOD PRESSURE: 160 MMHG | DIASTOLIC BLOOD PRESSURE: 84 MMHG

## 2024-03-06 DIAGNOSIS — I10 ESSENTIAL (PRIMARY) HYPERTENSION: ICD-10-CM

## 2024-03-06 DIAGNOSIS — I45.10 UNSPECIFIED RIGHT BUNDLE-BRANCH BLOCK: ICD-10-CM

## 2024-03-06 DIAGNOSIS — E78.5 HYPERLIPIDEMIA, UNSPECIFIED: ICD-10-CM

## 2024-03-06 DIAGNOSIS — I35.1 NONRHEUMATIC AORTIC (VALVE) INSUFFICIENCY: ICD-10-CM

## 2024-03-06 DIAGNOSIS — R73.9 HYPERGLYCEMIA, UNSPECIFIED: ICD-10-CM

## 2024-03-06 PROCEDURE — 93000 ELECTROCARDIOGRAM COMPLETE: CPT

## 2024-03-06 PROCEDURE — 99204 OFFICE O/P NEW MOD 45 MIN: CPT | Mod: 25

## 2024-03-06 PROCEDURE — 36415 COLL VENOUS BLD VENIPUNCTURE: CPT

## 2024-03-06 PROCEDURE — 93306 TTE W/DOPPLER COMPLETE: CPT

## 2024-03-06 RX ORDER — HYDROCHLOROTHIAZIDE 25 MG/1
25 TABLET ORAL DAILY
Refills: 0 | Status: DISCONTINUED | COMMUNITY
End: 2024-03-06

## 2024-03-06 RX ORDER — ATORVASTATIN CALCIUM 20 MG/1
20 TABLET, FILM COATED ORAL
Qty: 90 | Refills: 3 | Status: DISCONTINUED | COMMUNITY
Start: 2022-12-15 | End: 2024-03-06

## 2024-03-06 RX ORDER — BETAMETHASONE DIPROPIONATE 0.5 MG/G
0.05 OINTMENT TOPICAL DAILY
Qty: 1 | Refills: 0 | Status: DISCONTINUED | COMMUNITY
Start: 2022-12-09 | End: 2024-03-06

## 2024-03-06 RX ORDER — METOPROLOL SUCCINATE 50 MG/1
50 TABLET, EXTENDED RELEASE ORAL
Qty: 180 | Refills: 1 | Status: ACTIVE | COMMUNITY
Start: 2022-10-23

## 2024-03-07 LAB
CHOLEST SERPL-MCNC: 192 MG/DL
ESTIMATED AVERAGE GLUCOSE: 126 MG/DL
HBA1C MFR BLD HPLC: 6 %
HDLC SERPL-MCNC: 39 MG/DL
LDLC SERPL CALC-MCNC: 123 MG/DL
NONHDLC SERPL-MCNC: 153 MG/DL
NT-PROBNP SERPL-MCNC: 334 PG/ML
T4 SERPL-MCNC: 9.7 UG/DL
TRIGL SERPL-MCNC: 170 MG/DL
TSH SERPL-ACNC: 2.83 UIU/ML

## 2024-03-08 NOTE — PHYSICAL EXAM
[Well Developed] : well developed [No Acute Distress] : no acute distress [Well Nourished] : well nourished [Normal Venous Pressure] : normal venous pressure [Normal Conjunctiva] : normal conjunctiva [No Carotid Bruit] : no carotid bruit [Normal S1, S2] : normal S1, S2 [No Rub] : no rub [No Murmur] : no murmur [No Gallop] : no gallop [Clear Lung Fields] : clear lung fields [No Respiratory Distress] : no respiratory distress  [Good Air Entry] : good air entry [Soft] : abdomen soft [Non Tender] : non-tender [Normal Bowel Sounds] : normal bowel sounds [Normal Gait] : normal gait [No Cyanosis] : no cyanosis [No Clubbing] : no clubbing [No Rash] : no rash [Moves all extremities] : moves all extremities [No Focal Deficits] : no focal deficits [Normal Speech] : normal speech [Alert and Oriented] : alert and oriented [de-identified] : 1+ bilateral edema [de-identified] : excellent strngth

## 2024-03-08 NOTE — DISCUSSION/SUMMARY
[FreeTextEntry1] : EKG:NSR,RBBB TTE: feel edema probably due to norvasc- reduce dosage( and may stop) add hydralazine and they will check bP and call me- check lipids and if high -resume statin

## 2024-03-08 NOTE — HISTORY OF PRESENT ILLNESS
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

## 2024-03-13 ENCOUNTER — LABORATORY RESULT (OUTPATIENT)
Age: 84
End: 2024-03-13

## 2024-03-13 RX ORDER — AMLODIPINE BESYLATE 5 MG/1
5 TABLET ORAL DAILY
Qty: 1 | Refills: 1 | Status: DISCONTINUED | COMMUNITY
Start: 2022-06-28 | End: 2024-03-13

## 2024-03-15 ENCOUNTER — EMERGENCY (EMERGENCY)
Facility: HOSPITAL | Age: 84
LOS: 1 days | Discharge: HOME | End: 2024-03-15
Attending: EMERGENCY MEDICINE
Payer: MEDICARE

## 2024-03-15 VITALS
OXYGEN SATURATION: 95 % | DIASTOLIC BLOOD PRESSURE: 80 MMHG | RESPIRATION RATE: 19 BRPM | SYSTOLIC BLOOD PRESSURE: 167 MMHG | HEART RATE: 81 BPM

## 2024-03-15 VITALS
OXYGEN SATURATION: 94 % | HEART RATE: 70 BPM | SYSTOLIC BLOOD PRESSURE: 191 MMHG | TEMPERATURE: 98 F | RESPIRATION RATE: 18 BRPM | DIASTOLIC BLOOD PRESSURE: 80 MMHG

## 2024-03-15 DIAGNOSIS — Z90.79 ACQUIRED ABSENCE OF OTHER GENITAL ORGAN(S): Chronic | ICD-10-CM

## 2024-03-15 LAB
ADD ON TEST-SPECIMEN IN LAB: SIGNIFICANT CHANGE UP
ALBUMIN SERPL ELPH-MCNC: 4.2 G/DL — SIGNIFICANT CHANGE UP (ref 3.3–5)
ALP SERPL-CCNC: 95 U/L — SIGNIFICANT CHANGE UP (ref 40–120)
ALT FLD-CCNC: 20 U/L — SIGNIFICANT CHANGE UP (ref 10–45)
ANION GAP SERPL CALC-SCNC: 14 MMOL/L — SIGNIFICANT CHANGE UP (ref 5–17)
APPEARANCE UR: CLEAR — SIGNIFICANT CHANGE UP
AST SERPL-CCNC: 20 U/L — SIGNIFICANT CHANGE UP (ref 10–40)
BACTERIA # UR AUTO: NEGATIVE /HPF — SIGNIFICANT CHANGE UP
BASOPHILS # BLD AUTO: 0.11 K/UL — SIGNIFICANT CHANGE UP (ref 0–0.2)
BASOPHILS NFR BLD AUTO: 1.3 % — SIGNIFICANT CHANGE UP (ref 0–2)
BILIRUB SERPL-MCNC: 0.3 MG/DL — SIGNIFICANT CHANGE UP (ref 0.2–1.2)
BILIRUB UR-MCNC: NEGATIVE — SIGNIFICANT CHANGE UP
BUN SERPL-MCNC: 21 MG/DL — SIGNIFICANT CHANGE UP (ref 7–23)
CALCIUM SERPL-MCNC: 9.3 MG/DL — SIGNIFICANT CHANGE UP (ref 8.4–10.5)
CAST: 0 /LPF — SIGNIFICANT CHANGE UP (ref 0–4)
CHLORIDE SERPL-SCNC: 103 MMOL/L — SIGNIFICANT CHANGE UP (ref 96–108)
CO2 SERPL-SCNC: 22 MMOL/L — SIGNIFICANT CHANGE UP (ref 22–31)
COLOR SPEC: YELLOW — SIGNIFICANT CHANGE UP
CREAT SERPL-MCNC: 0.93 MG/DL — SIGNIFICANT CHANGE UP (ref 0.5–1.3)
DIFF PNL FLD: NEGATIVE — SIGNIFICANT CHANGE UP
EGFR: 80 ML/MIN/1.73M2 — SIGNIFICANT CHANGE UP
EOSINOPHIL # BLD AUTO: 0.35 K/UL — SIGNIFICANT CHANGE UP (ref 0–0.5)
EOSINOPHIL NFR BLD AUTO: 4.2 % — SIGNIFICANT CHANGE UP (ref 0–6)
GLUCOSE SERPL-MCNC: 114 MG/DL — HIGH (ref 70–99)
GLUCOSE UR QL: NEGATIVE MG/DL — SIGNIFICANT CHANGE UP
HCT VFR BLD CALC: 43.4 % — SIGNIFICANT CHANGE UP (ref 39–50)
HGB BLD-MCNC: 14.2 G/DL — SIGNIFICANT CHANGE UP (ref 13–17)
IMM GRANULOCYTES NFR BLD AUTO: 0.6 % — SIGNIFICANT CHANGE UP (ref 0–0.9)
KETONES UR-MCNC: NEGATIVE MG/DL — SIGNIFICANT CHANGE UP
LEUKOCYTE ESTERASE UR-ACNC: NEGATIVE — SIGNIFICANT CHANGE UP
LYMPHOCYTES # BLD AUTO: 1.68 K/UL — SIGNIFICANT CHANGE UP (ref 1–3.3)
LYMPHOCYTES # BLD AUTO: 20 % — SIGNIFICANT CHANGE UP (ref 13–44)
MCHC RBC-ENTMCNC: 29.8 PG — SIGNIFICANT CHANGE UP (ref 27–34)
MCHC RBC-ENTMCNC: 32.7 GM/DL — SIGNIFICANT CHANGE UP (ref 32–36)
MCV RBC AUTO: 91 FL — SIGNIFICANT CHANGE UP (ref 80–100)
MONOCYTES # BLD AUTO: 0.84 K/UL — SIGNIFICANT CHANGE UP (ref 0–0.9)
MONOCYTES NFR BLD AUTO: 10 % — SIGNIFICANT CHANGE UP (ref 2–14)
NEUTROPHILS # BLD AUTO: 5.35 K/UL — SIGNIFICANT CHANGE UP (ref 1.8–7.4)
NEUTROPHILS NFR BLD AUTO: 63.9 % — SIGNIFICANT CHANGE UP (ref 43–77)
NITRITE UR-MCNC: NEGATIVE — SIGNIFICANT CHANGE UP
NRBC # BLD: 0 /100 WBCS — SIGNIFICANT CHANGE UP (ref 0–0)
PH UR: 6.5 — SIGNIFICANT CHANGE UP (ref 5–8)
PLATELET # BLD AUTO: 317 K/UL — SIGNIFICANT CHANGE UP (ref 150–400)
POTASSIUM SERPL-MCNC: 4.2 MMOL/L — SIGNIFICANT CHANGE UP (ref 3.5–5.3)
POTASSIUM SERPL-SCNC: 4.2 MMOL/L — SIGNIFICANT CHANGE UP (ref 3.5–5.3)
PROT SERPL-MCNC: 7.4 G/DL — SIGNIFICANT CHANGE UP (ref 6–8.3)
PROT UR-MCNC: SIGNIFICANT CHANGE UP MG/DL
RBC # BLD: 4.77 M/UL — SIGNIFICANT CHANGE UP (ref 4.2–5.8)
RBC # FLD: 13.5 % — SIGNIFICANT CHANGE UP (ref 10.3–14.5)
RBC CASTS # UR COMP ASSIST: 0 /HPF — SIGNIFICANT CHANGE UP (ref 0–4)
SODIUM SERPL-SCNC: 139 MMOL/L — SIGNIFICANT CHANGE UP (ref 135–145)
SP GR SPEC: 1.02 — SIGNIFICANT CHANGE UP (ref 1–1.03)
SQUAMOUS # UR AUTO: 0 /HPF — SIGNIFICANT CHANGE UP (ref 0–5)
TROPONIN T, HIGH SENSITIVITY RESULT: 23 NG/L — SIGNIFICANT CHANGE UP (ref 0–51)
UROBILINOGEN FLD QL: 0.2 MG/DL — SIGNIFICANT CHANGE UP (ref 0.2–1)
WBC # BLD: 8.38 K/UL — SIGNIFICANT CHANGE UP (ref 3.8–10.5)
WBC # FLD AUTO: 8.38 K/UL — SIGNIFICANT CHANGE UP (ref 3.8–10.5)
WBC UR QL: 0 /HPF — SIGNIFICANT CHANGE UP (ref 0–5)

## 2024-03-15 PROCEDURE — 93005 ELECTROCARDIOGRAM TRACING: CPT

## 2024-03-15 PROCEDURE — 85025 COMPLETE CBC W/AUTO DIFF WBC: CPT

## 2024-03-15 PROCEDURE — 71045 X-RAY EXAM CHEST 1 VIEW: CPT

## 2024-03-15 PROCEDURE — 82550 ASSAY OF CK (CPK): CPT

## 2024-03-15 PROCEDURE — 99285 EMERGENCY DEPT VISIT HI MDM: CPT | Mod: 25

## 2024-03-15 PROCEDURE — 36415 COLL VENOUS BLD VENIPUNCTURE: CPT

## 2024-03-15 PROCEDURE — 96374 THER/PROPH/DIAG INJ IV PUSH: CPT | Mod: XU

## 2024-03-15 PROCEDURE — 70498 CT ANGIOGRAPHY NECK: CPT | Mod: MC

## 2024-03-15 PROCEDURE — 70450 CT HEAD/BRAIN W/O DYE: CPT | Mod: MC

## 2024-03-15 PROCEDURE — 70498 CT ANGIOGRAPHY NECK: CPT | Mod: 26,MC

## 2024-03-15 PROCEDURE — 80053 COMPREHEN METABOLIC PANEL: CPT

## 2024-03-15 PROCEDURE — 85730 THROMBOPLASTIN TIME PARTIAL: CPT

## 2024-03-15 PROCEDURE — 70496 CT ANGIOGRAPHY HEAD: CPT | Mod: 26,MC

## 2024-03-15 PROCEDURE — 84484 ASSAY OF TROPONIN QUANT: CPT

## 2024-03-15 PROCEDURE — 71045 X-RAY EXAM CHEST 1 VIEW: CPT | Mod: 26

## 2024-03-15 PROCEDURE — 85610 PROTHROMBIN TIME: CPT

## 2024-03-15 PROCEDURE — 70450 CT HEAD/BRAIN W/O DYE: CPT | Mod: 26,MC,XU

## 2024-03-15 PROCEDURE — 83880 ASSAY OF NATRIURETIC PEPTIDE: CPT

## 2024-03-15 PROCEDURE — 81001 URINALYSIS AUTO W/SCOPE: CPT

## 2024-03-15 PROCEDURE — 82553 CREATINE MB FRACTION: CPT

## 2024-03-15 PROCEDURE — 87086 URINE CULTURE/COLONY COUNT: CPT

## 2024-03-15 PROCEDURE — 99291 CRITICAL CARE FIRST HOUR: CPT | Mod: GC

## 2024-03-15 PROCEDURE — 70496 CT ANGIOGRAPHY HEAD: CPT | Mod: MC

## 2024-03-15 RX ORDER — ASPIRIN/CALCIUM CARB/MAGNESIUM 324 MG
81 TABLET ORAL ONCE
Refills: 0 | Status: COMPLETED | OUTPATIENT
Start: 2024-03-15 | End: 2024-03-15

## 2024-03-15 RX ORDER — HYDRALAZINE HCL 50 MG
10 TABLET ORAL ONCE
Refills: 0 | Status: COMPLETED | OUTPATIENT
Start: 2024-03-15 | End: 2024-03-15

## 2024-03-15 RX ADMIN — Medication 81 MILLIGRAM(S): at 22:34

## 2024-03-15 RX ADMIN — Medication 10 MILLIGRAM(S): at 21:30

## 2024-03-15 NOTE — ED ADULT NURSE NOTE - NSFALLUNIVINTERV_ED_ALL_ED
Bed/Stretcher in lowest position, wheels locked, appropriate side rails in place/Call bell, personal items and telephone in reach/Instruct patient to call for assistance before getting out of bed/chair/stretcher/Non-slip footwear applied when patient is off stretcher/[place] to call system/Physically safe environment - no spills, clutter or unnecessary equipment/Purposeful proactive rounding/Room/bathroom lighting operational, light cord in reach

## 2024-03-15 NOTE — ED PROVIDER NOTE - NSFOLLOWUPINSTRUCTIONS_ED_ALL_ED_FT
Today you were seen in the emergency room for evaluation of high blood pressure and word finding difficulty.     Your head imaging was negative,     Hypertension, commonly called high blood pressure, is when the force of blood pumping through your arteries is too strong. Hypertension forces your heart to work harder to pump blood. Your arteries may become narrow or stiff. Having untreated or uncontrolled hypertension for a long period of time can cause heart attack, stroke, kidney disease, and other problems. If started on a medication, take exactly as prescribed by your health care professional. Maintain a healthy lifestyle and follow up with your primary care physician.    SEEK IMMEDIATE MEDICAL CARE IF YOU HAVE ANY OF THE FOLLOWING SYMPTOMS: severe headache, confusion, chest pain, abdominal pain, vomiting, or shortness of breath. Today you were seen in the emergency room for evaluation of high blood pressure and word finding difficulty.     Hypertension, commonly called high blood pressure, is when the force of blood pumping through your arteries is too strong. Hypertension forces your heart to work harder to pump blood. Your arteries may become narrow or stiff. Having untreated or uncontrolled hypertension for a long period of time can cause heart attack, stroke, kidney disease, and other problems. If started on a medication, take exactly as prescribed by your health care professional. Maintain a healthy lifestyle and follow up with your primary care physician.    Your head imaging was negative.     We recommend taking your hydralazine 3 times per day, instead of 2. We also recommend starting a baby aspirin (81mg) daily. Please let your cardiologist know you are starting this medication.     Please follow up with your cardiologist and primary care doctor within the next 1-2 days.     SEEK IMMEDIATE MEDICAL CARE IF YOU HAVE ANY OF THE FOLLOWING SYMPTOMS: severe headache, confusion, chest pain, abdominal pain, vomiting, or shortness of breath.

## 2024-03-15 NOTE — CONSULT NOTE ADULT - SUBJECTIVE AND OBJECTIVE BOX
Neurology - Consult Note    -  Spectra: [phone] ([facility]), [phone] ([facility])  -    HPI: Patient CAIO EWING is a 84y (1940) wo/man with a PMHx significant for ***      Review of Systems:  INCOMPLETE   CONSTITUTIONAL: No fevers or chills  EYES AND ENT: No visual changes or no throat pain   NECK: No pain or stiffness  RESPIRATORY: No hemoptysis or shortness of breath  CARDIOVASCULAR: No chest pain or palpitations  GASTROINTESTINAL: No melena or hematochezia  GENITOURINARY: No dysuria or hematuria  NEUROLOGICAL: +As stated in HPI above  SKIN: No itching, burning, rashes, or lesions   All other review of systems is negative unless indicated above.    Allergies:  Grape Juice (Unknown)  valsartan (Angioedema)  Wine (Unknown)  [place] (Other)  lisinopril (Angioedema)  Tuna (Other)      PMHx/PSHx/Family Hx: As above, otherwise see below   Hypertension    HTN (hypertension)        Social Hx:  No current use of tobacco, alcohol, or illicit drugs  Lives with ***    Medications:  MEDICATIONS  (STANDING):  aspirin enteric coated 81 milliGRAM(s) Oral once    MEDICATIONS  (PRN):      Vitals:  T(C): 36.9 (03-15-24 @ 19:36), Max: 36.9 (03-15-24 @ 19:36)  HR: 81 (03-15-24 @ 22:00) (67 - 81)  BP: 167/80 (03-15-24 @ 22:00) (167/80 - 207/91)  RR: 19 (03-15-24 @ 22:00) (18 - 20)  SpO2: 95% (03-15-24 @ 22:00) (94% - 95%)    Physical Examination: INCOMPLETE  General - NAD  Cardiovascular - Peripheral pulses palpable, no edema  Eyes - Fundoscopy with flat, sharp optic discs and no hemorrhage or exudates; Fundoscopy not well visualized; Fundoscopy not performed due to safety precautions in the setting of the COVID-19 pandemic    Neurologic Exam:  Mental status - Awake, Alert, Oriented to person, place, and time. Speech fluent, repetition and naming intact. Follows simple and complex commands. Attention/concentration, recent and remote memory (including registration and recall), and fund of knowledge intact    Cranial nerves - PERRLA, VFF, EOMI, face sensation (V1-V3) intact b/l, facial strength intact without asymmetry b/l, hearing intact b/l, palate with symmetric elevation, trapezius OR sternocleidomastiod 5/5 strength b/l, tongue midline on protrusion with full lateral movement    Motor - Normal bulk and tone throughout. No pronator drift.  Strength testing            Deltoid      Biceps      Triceps     Wrist Extension    Wrist Flexion     Interossei         R            5                 5               5                     5                              5                        5                 5  L             5                 5               5                     5                              5                        5                 5              Hip Flexion    Hip Extension    Knee Flexion    Knee Extension    Dorsiflexion    Plantar Flexion  R              5                           5                       5                           5                            5                          5  L              5                           5                        5                           5                            5                          5    Sensation - Light touch/temperature OR pain/vibration intact throughout    DTR's -             Biceps      Triceps     Brachioradialis      Patellar    Ankle    Toes/plantar response  R             2+             2+                  2+                       2+            2+                 Down  L              2+             2+                 2+                        2+           2+                 Down    Coordination - Finger to Nose intact b/l. No tremors appreciated    Gait and station - Normal casual gait. Romberg (-)    Labs:                        14.2   8.38  )-----------( 317      ( 15 Mar 2024 20:23 )             43.4     03-15    139  |  103  |  21  ----------------------------<  114<H>  4.2   |  22  |  0.93    Ca    9.3      15 Mar 2024 20:23    TPro  7.4  /  Alb  4.2  /  TBili  0.3  /  DBili  x   /  AST  20  /  ALT  20  /  AlkPhos  95  03-15    CAPILLARY BLOOD GLUCOSE        LIVER FUNCTIONS - ( 15 Mar 2024 20:23 )  Alb: 4.2 g/dL / Pro: 7.4 g/dL / ALK PHOS: 95 U/L / ALT: 20 U/L / AST: 20 U/L / GGT: x             PT/INR - ( 15 Mar 2024 20:23 )   PT: 11.3 sec;   INR: 1.03 ratio         PTT - ( 15 Mar 2024 20:23 )  PTT:31.0 sec  CSF:                  Radiology:     Neurology - Consult Note    -  Spectra: [phone] ([facility]), [phone] ([facility])  -    HPI: Patient CAIO EWING is a 84y (1940) man with a PMHx significant for HTN and BPH s/p TURP who presented as code stroke for episode of transient expressive aphasia lasting approximately 15 minute upon awakening from a nap.  Patient was accompanied by his daughter, a stroke neurologist Dr. Crouch. Patient had HTN of note up to 230 SBP and 220 SBP on repeat. He apparently recently was transitioned from his ACE-i  and ARB to amlodipine. This is done by his cardiologist because he had episodes of angioedema in the setting of ACE and ARB usage. Patient's daughter at bedside helpfully providing collateral. Patient at time of presentation was all back to his baseline. 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 He is independent with his ADLs - however, wife at bedside helps him keep track of his medications and certain aspects of organization.    LKW 16:00  mRS 0  NIHSS 0    Not a tenecteplase candidate given there is resolution of symptoms which are mild.   Not a thrombectomy candidate given there is no large vessel occlusion.    Review of Systems: NEUROLOGICAL: +As stated in HPI above  SKIN: No itching, burning, rashes, or lesions   All other review of systems is negative unless indicated above.    Allergies:  Grape Juice (Unknown)  valsartan (Angioedema)  Wine (Unknown)  [place] (Other)  lisinopril (Angioedema)  Tuna (Other)      PMHx/PSHx/Family Hx: As above, otherwise see below   Hypertension    HTN (hypertension)        Social Hx:  No current use of tobacco, alcohol, or illicit drugs      Medications:  MEDICATIONS  (STANDING):  aspirin enteric coated 81 milliGRAM(s) Oral once    MEDICATIONS  (PRN):      Vitals:  T(C): 36.9 (03-15-24 @ 19:36), Max: 36.9 (03-15-24 @ 19:36)  HR: 81 (03-15-24 @ 22:00) (67 - 81)  BP: 167/80 (03-15-24 @ 22:00) (167/80 - 207/91)  RR: 19 (03-15-24 @ 22:00) (18 - 20)  SpO2: 95% (03-15-24 @ 22:00) (94% - 95%)    Physical Examination: Neurologic Exam:  Mental status - Awake, Alert, Oriented to person, place, and time. Speech fluent, repetition and naming intact. Follows simple and complex commands.   Cranial nerves - PERRLA, VFF, EOMI, face sensation (V1-V3) intact b/l, facial strength intact without asymmetry b/l, hearing intact b/l, palate with symmetric elevation, trapezius 5/5 strength b/l, tongue midline on protrusion with full lateral movement    Motor - Normal bulk and tone throughout. No pronator drift. Mild tremor noted at rest with the left hand, involving 1st and 2nd fingers, possible pill-rolling tremor.  Strength testing            Deltoid      Biceps      Triceps     Wrist Extension    Wrist Flexion     Interossei         R            5                 5               5                     5                              5                        5                 5  L             5                 5               5                     5                              5                        5                 5              Hip Flexion    Hip Extension    Knee Flexion    Knee Extension    Dorsiflexion    Plantar Flexion  R              5                           5                       5                           5                            5                          5  L              5                           5                        5                           5                            5                          5    Sensation - Light touch/temperature intact throughout    DTR's -             Biceps      Triceps     Brachioradialis      Patellar    Ankle    Toes/plantar response  R             2+             2+                  2+                       1+           absent               Down  L              2+             2+                 2+                        1+           absent            Down    Coordination - Finger to Nose intact b/l.     Labs:                        14.2   8.38  )-----------( 317      ( 15 Mar 2024 20:23 )             43.4     03-15    139  |  103  |  21  ----------------------------<  114<H>  4.2   |  22  |  0.93    Ca    9.3      15 Mar 2024 20:23    TPro  7.4  /  Alb  4.2  /  TBili  0.3  /  DBili  x   /  AST  20  /  ALT  20  /  AlkPhos  95  03-15    CAPILLARY BLOOD GLUCOSE        LIVER FUNCTIONS - ( 15 Mar 2024 20:23 )  Alb: 4.2 g/dL / Pro: 7.4 g/dL / ALK PHOS: 95 U/L / ALT: 20 U/L / AST: 20 U/L / GGT: x             PT/INR - ( 15 Mar 2024 20:23 )   PT: 11.3 sec;   INR: 1.03 ratio         PTT - ( 15 Mar 2024 20:23 )  PTT:31.0 sec                  Radiology:      IMPRESSION:    CTA HEAD:  Patent intracranial circulation without flow limiting stenosis.  No evidence of aneurysm.    CTA NECK:  No evidence of significant stenosis or occlusion.

## 2024-03-15 NOTE — ED PROVIDER NOTE - CLINICAL SUMMARY MEDICAL DECISION MAKING FREE TEXT BOX
84-year-old male with past medical history of hypertension, hyperlipidemia presents to emergency department for evaluation of high blood pressure and word finding difficulty at home today. Patient experienced 10 minutes of word finding difficulty with complete resolution.  No focal deficit emergency department on evaluation.  BPs at home with systolics above 220.  Urine emergency department /75.  Activated code stroke.  Will follow-up labs, scans and dispo per results and neurology Ranjit. 84-year-old male with past medical history of hypertension, hyperlipidemia presents to emergency department for evaluation of high blood pressure and word finding difficulty at home today. Patient experienced 10 minutes of word finding difficulty with complete resolution.  No focal deficit emergency department on evaluation.  BPs at home with systolics above 220.  Urine emergency department /75.  Activated code stroke.  Will follow-up labs, scans and dispo per results and neurology recs

## 2024-03-15 NOTE — ED PROVIDER NOTE - OBJECTIVE STATEMENT
84-year-old male with past medical history of hypertension, hyperlipidemia presents to emergency department for evaluation of high blood pressure and word finding difficulty at home today.  Per daughter the patient was in his usual state of health, took a nap around 4 PM, woke up at 5 PM and experienced 10 minutes of word finding difficulty.  Patient states during this time he recognized the difficulty with speaking.  Called daughter who was neurologist, and by the time she got to the house the symptoms had resolved.  During this episode patient was noted to have elevated BP, with systolics in the 220s and diastolics in the 120s.  They also took manual blood pressure with same result. Took extra dose of hydralazine 25mg PTA. Patient has been following closely with cardiology and recently had changes to hypertensive medications.  Is currently on spironolactone, metoprolol 50 twice daily and hydralazine 25 mg twice daily.  Had recent echocardiogram with evidence of "diastolic dysfunction and questionable pulmonary hypertension ".  Is scheduled for repeat echocardiogram with Dr. Che (cardiologist) this coming Thursday.  Patient denies headache, vision changes, fevers or recent illness, chest pain, shortness of breath, palpitations, GI symptoms, syncope or dizziness.  No known drug allergies. Was on AC during short rehab stay a few months ago, but was taken off of it upon discharge.

## 2024-03-15 NOTE — ED PROVIDER NOTE - PROGRESS NOTE DETAILS
[place] PGY1: stroke CT negative, patient still at baseline with better BP control, lab work otherwise unremarkable. plan to d/c with aspirin, change hydralazine to 25 TID. patient already has close follow up with cardiology.

## 2024-03-15 NOTE — ED PROVIDER NOTE - PHYSICAL EXAMINATION
Gen: well appearing, in no acute distress   Head: normal appearing  HEENT: normal conjunctiva, oral mucosa moist, vision grossly intact, PERRL   Lung: no respiratory distress, speaking in full sentences, CTA b/l, no wheeze, crackles or rhonchi, decreased breath sounds at HILARY bases   CV: regular rate and rhythm, no murmurs  Abd: soft, non distended, non tender   MSK: no visible deformities  Neuro: No focal deficits, AAOx3, 5/5 strength in HILARY LE and UE, speech is clear, no pronator drift, normal finger to nose exam, speech is clear, no word finding difficulty   Skin: Warm, no rashes   Psych: normal affect

## 2024-03-15 NOTE — ED PROVIDER NOTE - PATIENT PORTAL LINK FT
You can access the FollowMyHealth Patient Portal offered by [facility] by registering at the following website: [email or web address]. By joining [facility]’s FollowMyHealth portal, you will also be able to view your health information using other applications (apps) compatible with our system.

## 2024-03-15 NOTE — CONSULT NOTE ADULT - ASSESSMENT
[] Dc on ASA 81 mg daily  [] Can start Atorvastatin 80 mg qhs  [] BP management per outpatient cardiologist and family member neurologist  [] Patient can follow up with general neurology at [address] [place] [address] 1-2 weeks after discharge. Please instruct the patient to call [phone] to schedule this appointment.    Case discussed at length w/ stroke fellow under supervision of stroke attending and with neurologist physician daughter CAIO EWING is a 84y (1940) man with a PMHx significant for HTN and BPH s/p TURP who presented as code stroke for episode of transient expressive aphasia lasting approximately 15 minute upon awakening from a nap.  Patient was accompanied by his daughter, a stroke neurologist Dr. Crouch. Patient had HTN of note up to 230 SBP and 220 SBP on repeat. He apparently recently was transitioned from his ACE-i  and ARB to amlodipine. This is done by his cardiologist because he had episodes of angioedema in the setting of ACE and ARB usage. Patient's daughter at bedside helpfully providing collateral. Patient at time of presentation was all back to his baseline. 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 He is independent with his ADLs - however, wife at bedside helps him keep track of his medications and certain aspects of organization.    LKW 16:00  mRS 0  NIHSS 0    Not a tenecteplase candidate given there is resolution of symptoms which are mild.   Not a thrombectomy candidate given there is no large vessel occlusion.    IMPRESSION: Transient episode of expressive aphasia. Likely TIA vs in the setting of hypertensive encephalopathy.    RECOMMENDATIONS:  [] Dc on ASA 81 mg daily  [] Can start Atorvastatin 80 mg qhs  [] BP management per outpatient cardiologist and family member neurologist  [] Patient can follow up with general neurology at [address] [place] [address] 1-2 weeks after discharge. Please instruct the patient to call [phone] to schedule this appointment.    Case discussed at length w/ stroke fellow under supervision of stroke attending and with neurologist physician daughter CAIO EWING is a 84y (1940) man with a PMHx significant for HTN and BPH s/p TURP who presented as code stroke for episode of transient expressive aphasia lasting approximately 15 minute upon awakening from a nap.  Patient was accompanied by his daughter, a stroke neurologist Dr. Crouch. Patient had HTN of note up to 230 SBP and 220 SBP on repeat. He apparently recently was transitioned from his ACE-i  and ARB to amlodipine. This is done by his cardiologist because he had episodes of angioedema in the setting of ACE and ARB usage. Patient's daughter at bedside helpfully providing collateral. Patient at time of presentation was all back to his baseline. 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 He is independent with his ADLs - however, wife at bedside helps him keep track of his medications and certain aspects of organization.    LKW 16:00  mRS 0  NIHSS 0    Not a tenecteplase candidate given there is resolution of symptoms which are mild.   Not a thrombectomy candidate given there is no large vessel occlusion.    IMPRESSION: Transient episode of expressive aphasia. Likely TIA vs in the setting of hypertensive encephalopathy.    RECOMMENDATIONS:  [] Dc on ASA 81 mg daily  [] Can start Atorvastatin 80 mg qhs  [] BP management per outpatient cardiologist and family member neurologist  [] Patient can follow up with general neurology at [address] [place] [address] 1-2 weeks after discharge. Please instruct the patient to call [phone] to schedule this appointment.    Case discussed at length w/ stroke fellow Dr. Peoples, under supervision of Dr. Grey. Case also discussed with daughter, neurologist Dr. Crouch who is agreeable w/ above plan and to discharge the patient.

## 2024-03-15 NOTE — ED ADULT NURSE NOTE - OBJECTIVE STATEMENT
84 y.o M BIB self p/w c/o lethargy. A+OX4. Pt states 84 y.o M BIB self p/w c/o lethargy. A+OX4. Pt daughter states pt went for nap around 3pm and woke up increasingly lethargic from LNK at 3pm. 84 y.o M BIB self p/w c/o lethargy. A+OX4. Pt daughter states pt went for nap around 3pm and woke up increasingly lethargic from LNK at 3pm. States was confused w/ time of day thinking it was Friday morning instead of evening. Was recently switching off his amlodipine due to angioedema, and was put on hydralazine 25mg BID. HTN noted in EMS and upon arrival being 207/91 (130). Pt daughter states pt back at baseline, no current deficit noted, NIH 0, PERRL. Was having word finding difficulty as well s/p nap. 12 lead from EMS showing known R bundle branch block. Not on blood thinners, no hx of pervious strokes. 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 Pt currently denying any pain or discomforts. No other complaints at this time, daughter at bedside, safety maintained.

## 2024-03-15 NOTE — ED PROVIDER NOTE - ATTENDING CONTRIBUTION TO CARE
Hx: pt with wife and daughter (neurologist attending) at bedside who give the history.  Pt with episode of high BP and 15 minutes of trouble getting proper words out, resolved on its own.  Occurred PTA.  No priors like this. Code stroke called.    PE: well appearing, nontoxic, no respiratory distress.  Neuro nonfocal.  Skin intact. Psych normal mood.  Clear lungs, RRR, ab soft, nt.    MDM: likely TIA vs hypertensive encephalopathy, favoring more TIA.  check cbc r/o anemia or leukocytosis, check bmp to r/o metabolic derangement and lyte imbalance, ct head, ct angio with IV contrast head and neck, BP control.  Determine need for inpatient workup.    Progress Note 2200: reviewed results of ct scan and labs and concern for possible TIA with family including attending neurologist. Shared decision making, offered observation but declined, will f/u outpatient, pt is a good candidate for outpatient, recommend adding asa 81mg, stable for dc home.

## 2024-03-16 LAB
CULTURE RESULTS: NO GROWTH — SIGNIFICANT CHANGE UP
SPECIMEN SOURCE: SIGNIFICANT CHANGE UP

## 2024-03-18 DIAGNOSIS — I27.20 PULMONARY HYPERTENSION, UNSPECIFIED: ICD-10-CM

## 2024-03-19 ENCOUNTER — APPOINTMENT (OUTPATIENT)
Dept: PULMONOLOGY | Facility: CLINIC | Age: 84
End: 2024-03-19
Payer: MEDICARE

## 2024-03-19 VITALS — HEART RATE: 86 BPM | OXYGEN SATURATION: 95 %

## 2024-03-19 DIAGNOSIS — R60.0 LOCALIZED EDEMA: ICD-10-CM

## 2024-03-19 PROCEDURE — 99214 OFFICE O/P EST MOD 30 MIN: CPT

## 2024-03-19 PROCEDURE — 71046 X-RAY EXAM CHEST 2 VIEWS: CPT

## 2024-03-20 PROBLEM — R60.0 BILATERAL LEG EDEMA: Status: ACTIVE | Noted: 2024-03-06

## 2024-03-20 NOTE — HISTORY OF PRESENT ILLNESS
[TextBox_4] : Noted to have pulmonary hypertension on recent echocardiogram. Was having worsening edema of lower extremities amlodipine was discontinued his blood pressure then became significantly elevated and he was started on hydralazine and spironolactone Blood pressure has remained elevated Hospitalized for COVID in December 2023-Treated with remdesivir and dexamethasone Not reporting shortness of breath at this time and edema is significantly improved  Was also in ER in March for possible CVA had Episode of expressive aphasia which was attributed to hypertension issues

## 2024-03-20 NOTE — PHYSICAL THERAPY INITIAL EVALUATION ADULT - ASSISTIVE DEVICE:SUPINE/SIT, REHAB EVAL
Pt was directed to the ER  ----- Message from Ana Maria Lafleur sent at 3/20/2024  9:03 AM CDT -----  Regarding: advice  Type:  Needs Medical Advice    Who Called: pt  Would the patient rather a call back or a response via [facility]? Call  Best Call Back Number: [phone]  Additional Information: pt stated the benadryl is not helping with the allergic reaction, he stated he can't swallow, his skin is lifting up, patient would like a call back as soon as possible         
bed rails

## 2024-03-20 NOTE — PROCEDURE
[FreeTextEntry1] : Echocardiogram noted for a PA systolic pressure of 68 mmHg with normal right ventricular function reported

## 2024-03-20 NOTE — ASSESSMENT
[FreeTextEntry1] : Appears to have recovered from COVID.  Despite COVID and recent possible stroke overall looks better than he did at previous visits Do not believe that the pulmonary hypertension needs to be addressed at this time.  It is either a measurement artifact or a reflection of recent worsening of systemic hypertension.  Recommend more aggressive therapy of systemic hypertension and follow-up echocardiography. Advised to increase hydralazine to 50 mg twice daily and increase spironolactone to 50 mg daily  Recorded time for visit excludes procedures done in office and includes review of outside reports and imaging if appropriate and indicated above

## 2024-03-21 ENCOUNTER — APPOINTMENT (OUTPATIENT)
Dept: HEART AND VASCULAR | Facility: CLINIC | Age: 84
End: 2024-03-21
Payer: MEDICARE

## 2024-03-21 VITALS — HEART RATE: 84 BPM | DIASTOLIC BLOOD PRESSURE: 72 MMHG | SYSTOLIC BLOOD PRESSURE: 160 MMHG

## 2024-03-21 DIAGNOSIS — I10 ESSENTIAL (PRIMARY) HYPERTENSION: ICD-10-CM

## 2024-03-21 DIAGNOSIS — I51.7 CARDIOMEGALY: ICD-10-CM

## 2024-03-21 DIAGNOSIS — I37.1 NONRHEUMATIC PULMONARY VALVE INSUFFICIENCY: ICD-10-CM

## 2024-03-21 PROCEDURE — 99213 OFFICE O/P EST LOW 20 MIN: CPT | Mod: 25

## 2024-03-21 PROCEDURE — 93306 TTE W/DOPPLER COMPLETE: CPT

## 2024-03-21 RX ORDER — SPIRONOLACTONE 50 MG/1
50 TABLET ORAL
Qty: 90 | Refills: 1 | Status: ACTIVE | COMMUNITY
Start: 2024-03-06

## 2024-03-21 RX ORDER — HYDRALAZINE HYDROCHLORIDE 50 MG/1
50 TABLET ORAL TWICE DAILY
Qty: 60 | Refills: 0 | Status: ACTIVE | COMMUNITY
Start: 2024-03-13

## 2024-03-21 NOTE — PHYSICAL EXAM
[Well Developed] : well developed [Well Nourished] : well nourished [No Acute Distress] : no acute distress [Normal Conjunctiva] : normal conjunctiva [Normal Venous Pressure] : normal venous pressure [Normal S1, S2] : normal S1, S2 [No Murmur] : no murmur [No Rub] : no rub [No Gallop] : no gallop [Clear Lung Fields] : clear lung fields [Good Air Entry] : good air entry [No Respiratory Distress] : no respiratory distress  [Soft] : abdomen soft [Non Tender] : non-tender [Normal Gait] : normal gait [No Edema] : no edema [No Cyanosis] : no cyanosis [Moves all extremities] : moves all extremities [Normal Speech] : normal speech [No Focal Deficits] : no focal deficits [Alert and Oriented] : alert and oriented

## 2024-03-21 NOTE — DISCUSSION/SUMMARY
[FreeTextEntry1] : repeat TTE with mild pulmonary HPTN gave slip for labs continue hydralazine + spironolactone for HPTN

## 2024-03-28 ENCOUNTER — LABORATORY RESULT (OUTPATIENT)
Age: 84
End: 2024-03-28

## 2024-05-06 ENCOUNTER — APPOINTMENT (OUTPATIENT)
Dept: UROLOGY | Facility: CLINIC | Age: 84
End: 2024-05-06
Payer: MEDICARE

## 2024-05-06 DIAGNOSIS — Z78.9 OTHER SPECIFIED HEALTH STATUS: ICD-10-CM

## 2024-05-06 DIAGNOSIS — R39.9 UNSPECIFIED SYMPTOMS AND SIGNS INVOLVING THE GENITOURINARY SYSTEM: ICD-10-CM

## 2024-05-06 DIAGNOSIS — N39.46 MIXED INCONTINENCE: ICD-10-CM

## 2024-05-06 DIAGNOSIS — U07.1 COVID-19: ICD-10-CM

## 2024-05-06 PROCEDURE — 99204 OFFICE O/P NEW MOD 45 MIN: CPT

## 2024-05-06 PROCEDURE — 51741 ELECTRO-UROFLOWMETRY FIRST: CPT

## 2024-05-06 NOTE — LETTER BODY
[Dear  ___] : Dear  [unfilled], [Consult Letter:] : I had the pleasure of evaluating your patient, [unfilled]. [Please see my note below.] : Please see my note below. [Consult Closing:] : Thank you very much for allowing me to participate in the care of this patient.  If you have any questions, please do not hesitate to contact me. [Sincerely,] : Sincerely, [FreeTextEntry3] : Arvind Ruiz MD, FACS

## 2024-05-06 NOTE — PHYSICAL EXAM
[General Appearance - Well Developed] : well developed [General Appearance - Well Nourished] : well nourished [Normal Appearance] : normal appearance [Well Groomed] : well groomed [General Appearance - In No Acute Distress] : no acute distress [Edema] : no peripheral edema [Respiration, Rhythm And Depth] : normal respiratory rhythm and effort [Exaggerated Use Of Accessory Muscles For Inspiration] : no accessory muscle use [Abdomen Soft] : soft [Abdomen Tenderness] : non-tender [Abdomen Mass (___ Cm)] : no abdominal mass palpated [Costovertebral Angle Tenderness] : no ~M costovertebral angle tenderness [Urethral Meatus] : meatus normal [Penis Abnormality] : normal uncircumcised penis [Urinary Bladder Findings] : the bladder was normal on palpation [Epididymis] : the epididymides were normal [Testes Tenderness] : no tenderness of the testes [Testes Mass (___cm)] : there were no testicular masses [Prostate Tenderness] : the prostate was not tender [No Prostate Nodules] : no prostate nodules [Normal Station and Gait] : the gait and station were normal for the patient's age [] : no rash [No Focal Deficits] : no focal deficits [Oriented To Time, Place, And Person] : oriented to person, place, and time [Affect] : the affect was normal [Mood] : the mood was normal [Not Anxious] : not anxious [Inguinal Lymph Nodes Enlarged Bilaterally] : inguinal

## 2024-05-06 NOTE — ASSESSMENT
[FreeTextEntry1] : I discussed the findings and options with  and . CAIO STERLINGELAINE in detail.  The constellation of urinary symptoms and his prior to surgical procedures are somewhat unclear.  I advised that he obtain the operative reports and proceed with a retrograde urethrogram.  He may also need an in office cystoscopy, depending on the imaging findings.  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

## 2024-05-06 NOTE — HISTORY OF PRESENT ILLNESS
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 He presents with a several year onset of both obstructive and irritative voiding symptoms with nocturia x4.  He also has stress and urge incontinence managed with 2-3 Depends pads/day.  He had a procedure with Dr. Altamirano initially--? Urolift and a TURP by Dr. Carrion 1 year ago (no records available).  The latter procedure resulted in transient improvement.  IPSS: 17/6 Sono (performed to assess bladder emptying): 12cc PVR  Flow: 7.1ml/sec max (97ml voided), saw-toothed pattern  There is no family history of prostate cancer.

## 2024-05-08 DIAGNOSIS — N35.914 UNSPECIFIED ANTERIOR URETHRAL STRICTURE, MALE: ICD-10-CM

## 2024-05-08 LAB — BACTERIA UR CULT: NORMAL

## 2024-06-18 ENCOUNTER — OUTPATIENT (OUTPATIENT)
Dept: OUTPATIENT SERVICES | Facility: HOSPITAL | Age: 84
LOS: 1 days | End: 2024-06-18
Payer: MEDICARE

## 2024-06-18 ENCOUNTER — APPOINTMENT (OUTPATIENT)
Dept: RADIOLOGY | Facility: HOSPITAL | Age: 84
End: 2024-06-18

## 2024-06-18 DIAGNOSIS — Z90.79 ACQUIRED ABSENCE OF OTHER GENITAL ORGAN(S): Chronic | ICD-10-CM

## 2024-06-18 PROCEDURE — 51610 INJECTION FOR BLADDER X-RAY: CPT

## 2024-06-18 PROCEDURE — 74450 X-RAY URETHRA/BLADDER: CPT

## 2024-09-16 ENCOUNTER — APPOINTMENT (OUTPATIENT)
Dept: UROLOGY | Facility: CLINIC | Age: 84
End: 2024-09-16
Payer: MEDICARE

## 2024-09-16 VITALS — SYSTOLIC BLOOD PRESSURE: 190 MMHG | HEART RATE: 82 BPM | DIASTOLIC BLOOD PRESSURE: 82 MMHG | OXYGEN SATURATION: 95 %

## 2024-09-16 DIAGNOSIS — N35.914 UNSPECIFIED ANTERIOR URETHRAL STRICTURE, MALE: ICD-10-CM

## 2024-09-16 DIAGNOSIS — R39.9 UNSPECIFIED SYMPTOMS AND SIGNS INVOLVING THE GENITOURINARY SYSTEM: ICD-10-CM

## 2024-09-16 DIAGNOSIS — N39.46 MIXED INCONTINENCE: ICD-10-CM

## 2024-09-16 PROCEDURE — 99215 OFFICE O/P EST HI 40 MIN: CPT

## 2024-09-16 PROCEDURE — 51798 US URINE CAPACITY MEASURE: CPT

## 2024-09-16 NOTE — HISTORY OF PRESENT ILLNESS
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 He presents with a several year onset of both obstructive and irritative voiding symptoms with nocturia x2.  He also has stress and urge incontinence managed with 2-3 Depends pads/day.  He had a Urolift with Dr. Altamirano in early . This was preceded by an episode of urinary retention and he had been on tamsulosin 0.8mg daily.  The Urolift was not beneficial and he then had a TURP by Dr. Carrion in 2022, which resulted in moderate improvement.  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  There is no family history of prostate cancer.  RU24--"1 cm mild to moderate narrowing of the membranous portion of the urethra."   (I reviewed this imaging study which suggests the narrowing to be actually at the very proximal portion of the bulb.)

## 2024-09-16 NOTE — ASSESSMENT
[FreeTextEntry1] : I discussed the findings and options with  and . CAIO STERLINGATIYAESTELLA in detail and reviewed the available records.  He has improved spontaneously since his last visit with his main complaint continuing to be urinary incontinence.  I reviewed the retrograde urethrogram which suggests a very proximal urethral stricture and advised that if this is addressed surgically the incontinence would likely worsen.  Treating the stricture would only benefit the obstructive component of his urination which is negligible and, for those reasons I advised against any intervention.  They are in agreement with this approach and will simply follow-up in 1 year (postvoid bladder sonogram).

## 2024-09-18 LAB — BACTERIA UR CULT: NORMAL

## 2025-06-11 NOTE — PHYSICAL THERAPY INITIAL EVALUATION ADULT - DIAGNOSIS, PT EVAL
Previously Declined (within the last year)
Decreased functional mobility/capacity secondary to impairments listed below